# Patient Record
Sex: FEMALE | Race: ASIAN | NOT HISPANIC OR LATINO | Employment: UNEMPLOYED
[De-identification: names, ages, dates, MRNs, and addresses within clinical notes are randomized per-mention and may not be internally consistent; named-entity substitution may affect disease eponyms.]

---

## 2017-12-03 ENCOUNTER — HEALTH MAINTENANCE LETTER (OUTPATIENT)
Age: 37
End: 2017-12-03

## 2018-07-30 ENCOUNTER — OFFICE VISIT (OUTPATIENT)
Dept: FAMILY MEDICINE | Facility: CLINIC | Age: 38
End: 2018-07-30
Payer: COMMERCIAL

## 2018-07-30 VITALS — DIASTOLIC BLOOD PRESSURE: 74 MMHG | SYSTOLIC BLOOD PRESSURE: 111 MMHG | TEMPERATURE: 98.4 F

## 2018-07-30 DIAGNOSIS — Z71.84 TRAVEL ADVICE ENCOUNTER: Primary | ICD-10-CM

## 2018-07-30 DIAGNOSIS — Z23 NEED FOR VACCINATION: ICD-10-CM

## 2018-07-30 PROCEDURE — 90715 TDAP VACCINE 7 YRS/> IM: CPT | Mod: GA | Performed by: NURSE PRACTITIONER

## 2018-07-30 PROCEDURE — 90471 IMMUNIZATION ADMIN: CPT | Mod: GA | Performed by: NURSE PRACTITIONER

## 2018-07-30 PROCEDURE — 99402 PREV MED CNSL INDIV APPRX 30: CPT | Mod: 25 | Performed by: NURSE PRACTITIONER

## 2018-07-30 PROCEDURE — 90675 RABIES VACCINE IM: CPT | Mod: GA | Performed by: NURSE PRACTITIONER

## 2018-07-30 PROCEDURE — 90472 IMMUNIZATION ADMIN EACH ADD: CPT | Mod: GA | Performed by: NURSE PRACTITIONER

## 2018-07-30 RX ORDER — ATOVAQUONE AND PROGUANIL HYDROCHLORIDE 250; 100 MG/1; MG/1
1 TABLET, FILM COATED ORAL DAILY
Qty: 24 TABLET | Refills: 0 | Status: SHIPPED | OUTPATIENT
Start: 2018-07-30 | End: 2020-05-07

## 2018-07-30 RX ORDER — AZITHROMYCIN 500 MG/1
500 TABLET, FILM COATED ORAL DAILY
Qty: 3 TABLET | Refills: 0 | Status: SHIPPED | OUTPATIENT
Start: 2018-07-30 | End: 2018-08-02

## 2018-07-30 NOTE — PROGRESS NOTES
Nurse Note      Itinerary:  [place]      Departure Date: 11/14/18      Return Date: 11/27/18      Length of Trip 12 days      Reason for Travel: Tourism           Urban or rural: both      Accommodations: Hotel        IMMUNIZATION HISTORY  Have you received any immunizations within the past 4 weeks?  No  Have you ever fainted from having your blood drawn or from an injection?  No  Have you ever had a fever reaction to vaccination?  No  Have you ever had any bad reaction or side effect from any vaccination?  No  Have you ever had hepatitis A or B vaccine?  Yes  Do you live (or work closely) with anyone who has AIDS, an AIDS-like condition, any other immune disorder or who is on chemotherapy for cancer?  No  Do you have a family history of immunodeficiency?  No  Have you received any injection of immune globulin or any blood products during the past 12 months?  No    Patient roomed by Dc Hodges Porsha Harrison is a 38 year old female seen today with spouse for counsultation for international travel to [facility] for Tourism.  Patient will be departing in  4 month(s) and staying for   2 week(s) and  traveling with spouse.      Patient itinerary :  will be in the urban and rural ([facility]  region of [facility] which presents risk for Malaria, Dengue Fever, Chikungungya, Zika, Schistosomiasis, Rabies, food borne illnesses, motor vehicle accidents, Typhoid and Leishmaniasis. exposure.      Patient's activities will include sightseeing.    Patient's country of birth is     Special medical concerns: none  Pre-travel questionnaire was completed by patient and reviewed by provider.     Vitals: /74 (BP Location: Left arm, Cuff Size: Adult Regular)  Temp 98.4  F (36.9  C) (Oral)  BMI= There is no height or weight on file to calculate BMI.    EXAM:  General:  Well-nourished, well-developed in no acute distress.  Appears to be stated age, interacts appropriately and expresses understanding of  information given to patient.    No current outpatient prescriptions on file.     Patient Active Problem List   Diagnosis     Irregular menses     No Known Allergies      Immunizations discussed include:   Hepatitis A:  Immune by disease history per patient report  Hepatitis B: Immune by disease history per patient report  Influenza: vaccine is not available  Typhoid: future order placed  Rabies: Ordered/given today, risks, benefits and side effects reviewed  Yellow Fever: Not indicated  Japanese Encephalitis: Declined  Not concerned about risk of disease  Meningococcus: Not indicated  Tetanus/Diphtheria: Ordered/given today, risks, benefits and side effects reviewed  Measles/Mumps/Rubella: Up to date  Cholera: Not needed  Polio: Up to date  Pneumococcal: Under age of 65  Varicella: Immune by disease history per patient report  Zostavax:  Not indicated  Shingrix: Not indicated  HPV:  Not indicated  TB:  Low risk     Altitude Exposure on this trip: no  Past tolerance to Altitude: na    ASSESSMENT/PLAN:    ICD-10-CM    1. Travel advice encounter Z71.89 TDAP, IM (10 - 64 YRS) - Adacel     atovaquone-proguanil (MALARONE) 250-100 MG per tablet     C IMOVAX RABIES VACCINE, IM     TYPHOID VACCINE, IM     C IMOVAX RABIES VACCINE, IM     azithromycin (ZITHROMAX) 500 MG tablet     ADMIN 1st VACCINE     EA ADD'L VACCINE     CANCELED: TYPHOID VACCINE, IM     CANCELED: C IMOVAX RABIES VACCINE, IM   2. Need for vaccination Z23 TDAP, IM (10 - 64 YRS) - Adacel     C IMOVAX RABIES VACCINE, IM     TYPHOID VACCINE, IM     C IMOVAX RABIES VACCINE, IM     ADMIN 1st VACCINE     EA ADD'L VACCINE     CANCELED: TYPHOID VACCINE, IM     CANCELED: C IMOVAX RABIES VACCINE, IM     I have reviewed general recommendations for safe travel   including: food/water precautions, insect precautions, safer sex   practices given high prevalence of Zika, HIV and other STDs,   roadway safety. Educational materials and Travax report provided.    Lilian  prophylaxis recommended: Malarone  Symptomatic treatment for traveler's diarrhea: azithromycin  Altitude illness prevention and treatment: none      Evacuation insurance advised and resources were provided to patient.    Total visit time 30 minutes  with over 50% of time spent counseling patient as detailed above.    Lorraine Phillips CNP

## 2018-07-30 NOTE — MR AVS SNAPSHOT
After Visit Summary   7/30/2018    Deepika Harrison    MRN: [id number]           Patient Information     Date Of Birth          1980        Visit Information        Provider Department      7/30/2018 9:30 AM Lorraine Phillips APRN [facility]        Today's Diagnoses     Travel advice encounter    -  1    Need for vaccination          Care Instructions    Today July 30, 2018 you received the    Rabies Vaccine - Please return on 8/6/18 for your 2nd dose and 8/20/2018 for your 3rd and final dose.    Tetanus (Tdap) Vaccine      08/06/2018  Typhoid IM  .    These appointments can be made as a NURSE ONLY visit.    **It is very important for the vaccinations to be given on the scheduled day(s), this helps ensure you receive the full effectiveness of the vaccine.**    Please call [facility] with any questions [phone]    Thank you for visiting [facility]                Follow-ups after your visit        Future tests that were ordered for you today     Open Standing Orders        Priority Remaining Interval Expires Ordered    C IMOVAX RABIES VACCINE, IM Routine 2/3  6/30/2019 7/30/2018          Open Future Orders        Priority Expected Expires Ordered    TYPHOID VACCINE, IM Routine 8/6/2018 7/30/2019 7/30/2018            Who to contact     If you have questions or need follow up information about today's clinic visit or your schedule please contact [facility] directly at [phone].  Normal or non-critical lab and imaging results will be communicated to you by MyChart, letter or phone within 4 business days after the clinic has received the results. If you do not hear from us within 7 days, please contact the clinic through MyChart or phone. If you have a critical or abnormal lab result, we will notify you by phone as soon as possible.  Submit refill requests through Kartela or call your pharmacy and they will forward the  refill request to us. Please allow 3 business days for your refill to be completed.          Additional Information About Your Visit        [employer] Information     [employer] gives you secure access to your electronic health record. If you see a primary care provider, you can also send messages to your care team and make appointments. If you have questions, please call your primary care clinic.  If you do not have a primary care provider, please call [phone] and they will assist you.        Care EveryWhere ID     This is your Care EveryWhere ID. This could be used by other organizations to access your [place] medical records  [id number]        Your Vitals Were     Temperature                   98.4  F (36.9  C) (Oral)            Blood Pressure from Last 3 Encounters:   07/30/18 111/74   12/17/15 116/78    Weight from Last 3 Encounters:   12/17/15 84 lb 6.4 oz (38.3 kg)              We Performed the Following     TDAP, IM (10 - 64 YRS) - Adacel          Today's Medication Changes          These changes are accurate as of 7/30/18 10:27 AM.  If you have any questions, ask your nurse or doctor.               Start taking these medicines.        Dose/Directions    atovaquone-proguanil 250-100 MG per tablet   Commonly known as:  MALARONE   Used for:  Travel advice encounter   Started by:  Lorraine Phillips APRN CNP        Dose:  1 tablet   Take 1 tablet by mouth daily Start 2 days before exposure to Malaria and continue daily till  7 days after exposure.   Quantity:  24 tablet   Refills:  0       azithromycin 500 MG tablet   Commonly known as:  ZITHROMAX   Used for:  Travel advice encounter   Started by:  Lorraine Phillips APRN CNP        Dose:  500 mg   Take 1 tablet (500 mg) by mouth daily for 3 doses Take 1 tablet a day for up to 3 days for severe diarrhea   Quantity:  3 tablet   Refills:  0            Where to get your medicines      These medications were sent to [facility] [address] [place] [address]  [address]  [address], [facility] [address]     Phone:  [phone]     atovaquone-proguanil 250-100 MG per tablet    azithromycin 500 MG tablet                Primary Care Provider Office Phone # Fax #    [facility] [phone] [address]  [facility] [address]        Equal Access to Services     GABINO MCCOLLUM : Hadii aad ku hadasho Soomaali, waaxda luqadaha, qaybta kaalmada adeegyada, waxay idiin hayaan adeeg khgreggsh la'aan . So [facility] [phone].    ATENCIÓN: Si habla espzoya, tiene a trimble disposición servicios gratuitos de asistencia lingüística. Llame al [phone].    We comply with applicable federal civil rights laws and [place] laws. We do not discriminate on the basis of race, color, national origin, age, disability, sex, sexual orientation, or gender identity.            Thank you!     Thank you for choosing [facility]  for your care. Our goal is always to provide you with excellent care. Hearing back from our patients is one way we can continue to improve our services. Please take a few minutes to complete the written survey that you may receive in the mail after your visit with us. Thank you!             Your Updated Medication List - Protect others around you: Learn how to safely use, store and throw away your medicines at www.disposemymeds.org.          This list is accurate as of 7/30/18 10:27 AM.  Always use your most recent med list.                   Brand Name Dispense Instructions for use Diagnosis    atovaquone-proguanil 250-100 MG per tablet    MALARONE    24 tablet    Take 1 tablet by mouth daily Start 2 days before exposure to Malaria and continue daily till  7 days after exposure.    Travel advice encounter       azithromycin 500 MG tablet    ZITHROMAX    3 tablet    Take 1 tablet (500 mg) by mouth daily for 3 doses Take 1 tablet a day for up to 3 days for severe diarrhea    Travel advice encounter

## 2018-07-30 NOTE — NURSING NOTE
Chief Complaint   Patient presents with     [facility] Clinic     initial /74 (BP Location: Left arm, Cuff Size: Adult Regular)  Temp 98.4  F (36.9  C) (Oral) Estimated body mass index is 16.48 kg/(m^2) as calculated from the following:    Height as of 12/17/15: 5' (1.524 m).    Weight as of 12/17/15: 84 lb 6.4 oz (38.3 kg).  BP completed using cuff size: regular.  L   arm      Health Maintenance that is potentially due pending provider review:  NONE    n/a    Dc Upton ma

## 2018-07-30 NOTE — PATIENT INSTRUCTIONS
Today July 30, 2018 you received the    Rabies Vaccine - Please return on 8/6/18 for your 2nd dose and 8/20/2018 for your 3rd and final dose.    Tetanus (Tdap) Vaccine      08/06/2018  Typhoid IM  .    These appointments can be made as a NURSE ONLY visit.    **It is very important for the vaccinations to be given on the scheduled day(s), this helps ensure you receive the full effectiveness of the vaccine.**    Please call [facility] with any questions [phone]    Thank you for visiting [facility]

## 2018-08-06 ENCOUNTER — ALLIED HEALTH/NURSE VISIT (OUTPATIENT)
Dept: NURSING | Facility: CLINIC | Age: 38
End: 2018-08-06
Payer: COMMERCIAL

## 2018-08-06 DIAGNOSIS — Z23 NEED FOR VACCINATION: ICD-10-CM

## 2018-08-06 DIAGNOSIS — Z71.84 TRAVEL ADVICE ENCOUNTER: ICD-10-CM

## 2018-08-06 PROCEDURE — 99207 ZZC NO CHARGE NURSE ONLY: CPT

## 2018-08-06 PROCEDURE — 90472 IMMUNIZATION ADMIN EACH ADD: CPT | Mod: GA

## 2018-08-06 PROCEDURE — 90675 RABIES VACCINE IM: CPT | Mod: GA

## 2018-08-06 PROCEDURE — 90691 TYPHOID VACCINE IM: CPT | Mod: GA

## 2018-08-06 PROCEDURE — 90471 IMMUNIZATION ADMIN: CPT | Mod: GA

## 2018-08-06 NOTE — NURSING NOTE
Screening Questionnaire for Adult Immunization    Are you sick today?   No   Do you have allergies to medications, food, a vaccine component or latex?   No   Have you ever had a serious reaction after receiving a vaccination?   No   Do you have a long-term health problem with heart disease, lung disease, asthma, kidney disease, metabolic disease (e.g. diabetes), anemia, or other blood disorder?   No   Do you have cancer, leukemia, HIV/AIDS, or any other immune system problem?   No   In the past 3 months, have you taken medications that affect  your immune system, such as prednisone, other steroids, or anticancer drugs; drugs for the treatment of rheumatoid arthritis, Crohn s disease, or psoriasis; or have you had radiation treatments?   No   Have you had a seizure, or a brain or other nervous system problem?   No   During the past year, have you received a transfusion of blood or blood     products, or been given immune (gamma) globulin or antiviral drug?   No   For women: Are you pregnant or is there a chance you could become        pregnant during the next month?   No   Have you received any vaccinations in the past 4 weeks?   No     Immunization questionnaire answers were all negative.        Per orders of CLAYTON Bruner, injection of Rabies and Typhoid was given by Hernandez Walter. Patient instructed to remain in clinic for 15 minutes afterwards, and to report any adverse reaction to me immediately.       Screening performed by Hernandez Walter on 8/6/2018 at 7:44 AM.

## 2018-08-20 ENCOUNTER — ALLIED HEALTH/NURSE VISIT (OUTPATIENT)
Dept: NURSING | Facility: CLINIC | Age: 38
End: 2018-08-20
Payer: COMMERCIAL

## 2018-08-20 DIAGNOSIS — Z71.84 TRAVEL ADVICE ENCOUNTER: ICD-10-CM

## 2018-08-20 DIAGNOSIS — Z23 NEED FOR VACCINATION: ICD-10-CM

## 2018-08-20 PROCEDURE — 90675 RABIES VACCINE IM: CPT | Mod: GA

## 2018-08-20 PROCEDURE — 99207 ZZC NO CHARGE LOS: CPT

## 2018-08-20 PROCEDURE — 90471 IMMUNIZATION ADMIN: CPT | Mod: GA

## 2018-08-20 NOTE — MR AVS SNAPSHOT
After Visit Summary   8/20/2018    Werner Harrison    MRN: [id number]           Patient Information     Date Of Birth          1980        Visit Information        Provider Department      8/20/2018 9:00 AM UP NURSE [place] [place] [facility] Nurse        Today's Diagnoses     Need for vaccination        Travel advice encounter           Follow-ups after your visit        Who to contact     If you have questions or need follow up information about today's clinic visit or your schedule please contact [facility] directly at [phone].  Normal or non-critical lab and imaging results will be communicated to you by Socrativehart, letter or phone within 4 business days after the clinic has received the results. If you do not hear from us within 7 days, please contact the clinic through [employer] or phone. If you have a critical or abnormal lab result, we will notify you by phone as soon as possible.  Submit refill requests through [employer] or call your pharmacy and they will forward the refill request to us. Please allow 3 business days for your refill to be completed.          Additional Information About Your Visit        MyChart Information     [employer] gives you secure access to your electronic health record. If you see a primary care provider, you can also send messages to your care team and make appointments. If you have questions, please call your primary care clinic.  If you do not have a primary care provider, please call [phone] and they will assist you.        Care EveryWhere ID     This is your Care EveryWhere ID. This could be used by other organizations to access your [place] medical records  [id number]         Blood Pressure from Last 3 Encounters:   07/30/18 111/74   12/17/15 116/78    Weight from Last 3 Encounters:   12/17/15 84 lb 6.4 oz (38.3 kg)              We Performed the Following     C IMOVAX RABIES VACCINE, IM     VACCINE ADMINISTRATION, INITIAL        Primary Care  Provider Office Phone # Fax #    [facility] [phone] [address]  [facility] [address]        Equal Access to Services     CEDRICK MCCOLLUM : Hadii aad ku hadasho Soomaali, waaxda luqadaha, qaybta kaalmada adeegyada, waxkathleen lopezn enedina dickson laPoloholger carreon. So [facility] [phone].    ATENCIÓN: Si habla español, tiene a trimble disposición servicios gratuitos de asistencia lingüística. Llame al [phone].    We comply with applicable federal civil rights laws and [place] laws. We do not discriminate on the basis of race, color, national origin, age, disability, sex, sexual orientation, or gender identity.            Thank you!     Thank you for choosing [facility]  for your care. Our goal is always to provide you with excellent care. Hearing back from our patients is one way we can continue to improve our services. Please take a few minutes to complete the written survey that you may receive in the mail after your visit with us. Thank you!             Your Updated Medication List - Protect others around you: Learn how to safely use, store and throw away your medicines at www.disposemymeds.org.          This list is accurate as of 8/20/18  5:27 PM.  Always use your most recent med list.                   Brand Name Dispense Instructions for use Diagnosis    atovaquone-proguanil 250-100 MG per tablet    MALARONE    24 tablet    Take 1 tablet by mouth daily Start 2 days before exposure to Malaria and continue daily till  7 days after exposure.    Travel advice encounter

## 2018-08-20 NOTE — PROGRESS NOTES
Screening Questionnaire for Adult Immunization    Are you sick today?   No   Do you have allergies to medications, food, a vaccine component or latex?   No   Have you ever had a serious reaction after receiving a vaccination?   No   Do you have a long-term health problem with heart disease, lung disease, asthma, kidney disease, metabolic disease (e.g. diabetes), anemia, or other blood disorder?   No   Do you have cancer, leukemia, HIV/AIDS, or any other immune system problem?   No   In the past 3 months, have you taken medications that affect  your immune system, such as prednisone, other steroids, or anticancer drugs; drugs for the treatment of rheumatoid arthritis, Crohn s disease, or psoriasis; or have you had radiation treatments?   No   Have you had a seizure, or a brain or other nervous system problem?   No   During the past year, have you received a transfusion of blood or blood     products, or been given immune (gamma) globulin or antiviral drug?   No   For women: Are you pregnant or is there a chance you could become        pregnant during the next month?   No   Have you received any vaccinations in the past 4 weeks?   No     Immunization questionnaire answers were all negative.      Prior to injection verified patient identity using patient's name and date of birth.  Due to injection administration, patient instructed to remain in clinic for 15 minutes  afterwards, and to report any adverse reaction to me immediately.      Per orders of JD Phillips, injection of Rabies given by Tamika Mendoza. Patient instructed to remain in clinic for 15 minutes afterwards, and to report any adverse reaction to me immediately.       Screening performed by Tamika Mendoza on 8/20/2018 at 5:25 PM.

## 2019-03-11 ENCOUNTER — OFFICE VISIT (OUTPATIENT)
Dept: FAMILY MEDICINE | Facility: CLINIC | Age: 39
End: 2019-03-11
Payer: COMMERCIAL

## 2019-03-11 VITALS
RESPIRATION RATE: 16 BRPM | WEIGHT: 74.4 LBS | TEMPERATURE: 97.6 F | DIASTOLIC BLOOD PRESSURE: 56 MMHG | BODY MASS INDEX: 14.05 KG/M2 | SYSTOLIC BLOOD PRESSURE: 95 MMHG | HEIGHT: 61 IN | HEART RATE: 82 BPM

## 2019-03-11 DIAGNOSIS — M25.511 ACUTE PAIN OF RIGHT SHOULDER: ICD-10-CM

## 2019-03-11 DIAGNOSIS — M54.2 NECK PAIN: Primary | ICD-10-CM

## 2019-03-11 DIAGNOSIS — M79.601 PAIN OF RIGHT UPPER EXTREMITY: ICD-10-CM

## 2019-03-11 PROCEDURE — 99214 OFFICE O/P EST MOD 30 MIN: CPT | Performed by: NURSE PRACTITIONER

## 2019-03-11 RX ORDER — CYCLOBENZAPRINE HCL 5 MG
5 TABLET ORAL 3 TIMES DAILY PRN
Qty: 30 TABLET | Refills: 0 | Status: SHIPPED | OUTPATIENT
Start: 2019-03-11 | End: 2020-05-07

## 2019-03-11 ASSESSMENT — MIFFLIN-ST. JEOR: SCORE: 964.86

## 2019-03-11 NOTE — PROGRESS NOTES
"  SUBJECTIVE:   Werner Harrison is a 38 year old female who presents to clinic today for the following health issues:        Musculoskeletal problem/pain      Duration: 3 MONTHS      Description  Location: right shoulder and right neck pain    Intensity:  moderate    Accompanying signs and symptoms: tingling    History  Previous similar problem: no   Previous evaluation:  none    Precipitating or alleviating factors:  Trauma or overuse: no   Aggravating factors include: moving her right arm       Problem list and histories reviewed & adjusted, as indicated.  Additional history: as documented    Reviewed and updated as needed this visit by clinical staff  Tobacco  Allergies  Meds  Problems  Med Hx  Surg Hx  Fam Hx  Soc Hx        Reviewed and updated as needed this visit by Provider  Tobacco  Allergies  Meds  Problems  Med Hx  Surg Hx  Fam Hx         ROS:  Constitutional, HEENT, cardiovascular, pulmonary, gi and gu systems are negative, except as otherwise noted.    OBJECTIVE:     BP 95/56   Pulse 82   Temp 97.6  F (36.4  C) (Tympanic)   Resp 16   Ht 1.549 m (5' 1\")   Wt 33.7 kg (74 lb 6.4 oz)   BMI 14.06 kg/m    Body mass index is 14.06 kg/m .  GENERAL: healthy, alert and no distress  EYES: Eyes grossly normal to inspection, PERRL and conjunctivae and sclerae normal  HENT: ear canals and TM's normal, nose and mouth without ulcers or lesions  NECK: no adenopathy, no asymmetry, masses, or scars and thyroid normal to palpation  RESP: lungs clear to auscultation - no rales, rhonchi or wheezes  CV: regular rate and rhythm, normal S1 S2, no S3 or S4, no murmur, click or rub, no peripheral edema and peripheral pulses strong  MS: phalens positive on right - 2nd, 3rd and 4th fingertips.  tinels and finklesteins negative.  extremities normal- no gross deformities noted  SKIN: no suspicious lesions or rashes      ASSESSMENT/PLAN:       ICD-10-CM    1. Neck pain M54.2 [facility] " REFERRAL     cyclobenzaprine (FLEXERIL) 5 MG tablet   2. Acute pain of right shoulder M25.511 [facility], [facility] CHIR[facility] REFERRAL     cyclobenzaprine (FLEXERIL) 5 MG tablet   3. Pain of right upper extremity M79.601 [facility], [facility] CHIR[facility]RA[facility] REFERRAL     cyclobenzaprine (FLEXERIL) 5 MG tablet     Neck/shoulder and right arm pains with overuse/typing.    2nd 3rd and 4th fingertips with phalens.    Will trial flexeril as needed for nighttime use only.    Recommend PT for stretching and strengthening  Hot packs/ massage to neck and shoulder.    F/u if persists or worsens.    See Patient Instructions    JANUSZ Velez CNP  [facility]

## 2019-03-21 ENCOUNTER — THERAPY VISIT (OUTPATIENT)
Dept: PHYSICAL THERAPY | Facility: CLINIC | Age: 39
End: 2019-03-21
Attending: NURSE PRACTITIONER
Payer: COMMERCIAL

## 2019-03-21 DIAGNOSIS — M79.601 PAIN OF RIGHT UPPER EXTREMITY: ICD-10-CM

## 2019-03-21 DIAGNOSIS — M25.511 ACUTE PAIN OF RIGHT SHOULDER: ICD-10-CM

## 2019-03-21 DIAGNOSIS — M54.2 NECK PAIN: ICD-10-CM

## 2019-03-21 PROCEDURE — 97161 PT EVAL LOW COMPLEX 20 MIN: CPT | Mod: GP | Performed by: PHYSICAL THERAPIST

## 2019-03-21 PROCEDURE — 97530 THERAPEUTIC ACTIVITIES: CPT | Mod: GP | Performed by: PHYSICAL THERAPIST

## 2019-03-21 PROCEDURE — 97112 NEUROMUSCULAR REEDUCATION: CPT | Mod: GP | Performed by: PHYSICAL THERAPIST

## 2019-03-21 NOTE — PROGRESS NOTES
Physical Therapy Initial Evaluation  March 21, 2019     Precautions/Restrictions/MD instructions: PT eval and treat.     Subjective:   Date of Onset: 3 months ago, MD order on 3/11/19  C/C: pain at the right side of her lower neck, down medial forearm, and wrist. She also reports tingling into fingertips 1-4. She gets stress related headaches 1x every 2 weeks that are one sided, throbbing that she does not think is related to her arm pain.  Quality of pain is dull and aching and Numbness and tingling. Pains are described as intermittent in nature. Pain is worse: with use. Pain is rated 1-8/10.   History of symptoms: Pains began gradually as the result  Of unknown origins. She thinks it may be related to increased computer work. Since onset, symptoms are worsening. Previous episodes:none  Worsened by: computer work, supination, pronation, doing the dishes and lifting after a long day of work  Alleviated by: massage, rest.  Shaking her hand, ergonomic mouse (mousing with left hand as a result.)  General health as reported by patient: fair  Pertinent medical/surgical history: N&T, headaches, smoking, sleep disorder, chest pain- from panic disorder that happens infrequently. She denies night pain, significant current illness or recent hospital admission. She denies any regional implanted devices. She denies history of surgery in the area.  Imaging: none. Current occupational status: HR. Patient's goals are: decrease pain, learn how to prevent the pain, improve tolerance to computer work. Return to MD:  PRN.     Objective:  CERVICAL:    Posture: significant forward shoulder posture, scapular winging bilaterally, right greater than left.  Posture Correction: lumbar roll    Neurological:    Motor Deficit:  Myotomes L R   C4 (shoulder elevation) 5/5 4/5   C5 (shoulder abduction) 4+/5 4/5   C6 (elbow flexion) 5/5 4/5   C7 (elbow extension) 5/5 4/5   C8 (thumb extension) 5/5 4/5   T1 (finger add/abd) 5/5 4/5    Strength  (lb) WNL Slight reduction     Sensory Deficit, Reflexes, Dural Signs:   Intact to light touch sensation in all UE dermatomes. Brachioradialis, triceps and biceps reflex 2+ B.    AROM: (Major, Moderate, Minimal or Nil loss)  Movement Loss Jasson Mod Min Nil Pain   Protrusion  x   X    Flexion  x   X N&T   Retraction x    x   Extension  x   X N&T   Left Rotation   x  x   Right Rotation   x  x   Left Side Bending   x  x   Right Side bending   x  x     SHOULDER:   AROM L AROM R   Flex 180 180   Abd 180 175*   Ext 60 55*   ER 85 85*   Ext/IR T7 T6     ELBOW:   AROM L AROM R   Flex 135 135   Ext 0 0   Hyper Ext 10 8*   Pronation 80 75* N&T   Supination 90 90* N&T     WRIST:   AROM L AROM R   Flex 80 70   Ext 70 65     Special Testing:   L R   Arm Dominance  x   Carpal tunnel     Phalen's - +   Reverse Phalen's - +   tinel's - +   Hand shake for symptom relief  + +   Cervical     Spurlings  +   Distraction - Negative, no N&T in static position   Cubital tunnel     Tinel's - +       Assessment/Plan:    The patient is a 38 year old female with chief complaint of neck, right shoulder, and arm pain, numbness and tingling, consistent with carpal tunnel with a relevant cervical component.  The patient has the following significant findings with corresponding treatment plan.  Diagnosis 1:  Neck, right shoulder, and arm pain    Pain -  hot/cold therapy, manual therapy, splint/taping/bracing/orthotics, self management, education, directional preference exercise and home program  Decreased ROM/flexibility - manual therapy and therapeutic exercise  Decreased strength - therapeutic exercise and therapeutic activities  Decreased proprioception - neuro re-education and therapeutic activities  Impaired muscle performance - neuro re-education  Decreased function - therapeutic activities  Impaired posture - neuro re-education    Therapy Evaluation Codes:   1) History comprised of:   Personal factors that impact the plan of care:      Time  since onset of symptoms.    Comorbidity factors that impact the plan of care are:      Chest pain, Migraines/headaches, Numbness/tingling, Sleep disorder/apnea and Smoking.     Medications impacting care: None.  2) Examination of Body Systems comprised of:   Body structures and functions that impact the plan of care:      Cervical spine, Shoulder, Thoracic Spine and arm and hand.   Activity limitations that impact the plan of care are:      Cooking, Dressing, Lifting, Reading/Computer work and cleaning.   Clinical presentation characteristics are:    Stable/Uncomplicated.  3) Presentation comprised of:   Presentation scored as Low complexity with uncomplicated characteristics..  4) Decision-Making    Low complexity using standardized patient assessment instrument and/or measureable assessment of functional outcome.  Cumulative Therapy Evaluation is: Low complexity.    Previous and current functional limitations:  (See Goal Flow Sheet for this information)    Short term and Long term goals: (See Goal Flow Sheet for this information)     Communication ability:  Patient appears to be able to clearly communicate and understand verbal and written communication and follow directions correctly.  Treatment Explanation - The following has been discussed with the patient: RX ordered/plan of care, anticipated outcomes, and possible risks and side effects.  This patient would benefit from PT intervention to resume normal activities.   Rehab potential is good.    Frequency:  1 X week, once daily  Duration:  for 8 weeks  Discharge Plan: Achieve all LTGs, be independent in home treatment program, and reach maximal therapeutic benefit.    Please refer to the daily flowsheet for treatment today, total treatment time and time spent performing 1:1 timed codes.

## 2019-03-29 ENCOUNTER — DOCUMENTATION ONLY (OUTPATIENT)
Dept: FAMILY MEDICINE | Facility: CLINIC | Age: 39
End: 2019-03-29

## 2019-03-29 ENCOUNTER — THERAPY VISIT (OUTPATIENT)
Dept: PHYSICAL THERAPY | Facility: CLINIC | Age: 39
End: 2019-03-29
Payer: COMMERCIAL

## 2019-03-29 DIAGNOSIS — M25.511 ACUTE PAIN OF RIGHT SHOULDER: ICD-10-CM

## 2019-03-29 DIAGNOSIS — M79.601 PAIN OF RIGHT UPPER EXTREMITY: ICD-10-CM

## 2019-03-29 DIAGNOSIS — M54.2 NECK PAIN: ICD-10-CM

## 2019-03-29 PROCEDURE — 97140 MANUAL THERAPY 1/> REGIONS: CPT | Mod: GP | Performed by: PHYSICAL THERAPIST

## 2019-03-29 PROCEDURE — 97112 NEUROMUSCULAR REEDUCATION: CPT | Mod: GP | Performed by: PHYSICAL THERAPIST

## 2019-03-29 PROCEDURE — 97110 THERAPEUTIC EXERCISES: CPT | Mod: GP | Performed by: PHYSICAL THERAPIST

## 2019-03-29 NOTE — PROGRESS NOTES
Per Mimi Lester pt will need and office vist to have the FMLA forms completed. Called pt and notified her that she will need to be seen in clinic, pt states that is not a good time to talk and she will call back later to set up an Appt.    Place from's in the [place] nurse basket tell the pts comes in to the clinic.      Lou Hitchcock MA        HPI      ROS      Physical Exam

## 2019-03-29 NOTE — PROGRESS NOTES
Reason for call:  Form   Our goal is to have forms completed within 72 hours, however some forms may require a visit or additional information.     Who is the form from? Patient  Where did the form come from? Patient or family brought in     What clinic location was the form placed at?   Where was the form placed? 's Box  What number is listed as a contact on the form?     Phone call message - patient request for a letter, form or note:     Date needed: as soon as possible  Please fax to [phone]  Has the patient signed a consent form for release of information? Not Applicable    Additional comments: Please fax when completed. Patient will be in the office 4/5 and will  original.     Type of letter, form or note: LA    Phone number to reach patient:  Cell number on file:    Telephone Information:   Mobile [phone]       Best Time:  any    Can we leave a detailed message on this number?  YES

## 2019-04-30 ENCOUNTER — TELEPHONE (OUTPATIENT)
Dept: FAMILY MEDICINE | Facility: CLINIC | Age: 39
End: 2019-04-30

## 2019-04-30 NOTE — TELEPHONE ENCOUNTER
LVM on work number to schedule office visit with Mimi Lester in order to have the FMLA forms completed. Also sent another [employer] messge.Flor Echevarria NA/R

## 2019-05-03 NOTE — TELEPHONE ENCOUNTER
"Patient has read [employer] messages regarding forms but has not replied back. Forms placed in \"New patient\" accordion in [place].     Katherine Lott       "

## 2019-11-25 PROBLEM — M25.511 ACUTE PAIN OF RIGHT SHOULDER: Status: RESOLVED | Noted: 2019-03-21 | Resolved: 2019-11-25

## 2019-11-25 PROBLEM — M79.601 PAIN OF RIGHT UPPER EXTREMITY: Status: RESOLVED | Noted: 2019-03-21 | Resolved: 2019-11-25

## 2019-11-25 PROBLEM — M54.2 NECK PAIN: Status: RESOLVED | Noted: 2019-03-21 | Resolved: 2019-11-25

## 2019-11-25 NOTE — PROGRESS NOTES
Discharge Note    Progress reporting period is from last progress note on March 21 to Mar 29, 2019.    Werner failed to follow up and current status is unknown.  Please see information below for last relevant information on current status.  Patient seen for 2 visits.    SUBJECTIVE  Subjective changes noted by patient:  Pt reports limited compliance with HEP. She is having less pain in her shoulder and forearm. She has also been limiting her mousing. Reduced N&T from 10 hours per day to 5 hours per day. She is   .  Current pain level is (6-7/10, reduces to 4-5/10 following PT).     Previous pain level was  8/10(end of day).   Changes in function:  Yes (See Goal flowsheet attached for changes in current functional level)  Adverse reaction to treatment or activity: None    OBJECTIVE  Changes noted in objective findings: Cervical ROM: Flexion 50%, Extension 25%, L rotation 90%, R rotation 90%, retraction 50%, protrusion 75%. Following stretching/manual, Flexion 50%, Extension 75% without N&T, B rotation 100%, retraction 75%, protrusion 90%.     ASSESSMENT/PLAN  Diagnosis: neck, right arm and shoulder pain   Updated problem list and treatment plan:   Pain - HEP  Decreased ROM/flexibility - HEP  Decreased function - HEP  Decreased strength - HEP  Impaired muscle performance - HEP  Decreased proprioception - HEP  Impaired posture - HEP  STG/LTGs have been met or progress has been made towards goals:  Yes, please see goal flowsheet for most current information  Assessment of Progress: current status is unknown.    Last current status:     Self Management Plans:  HEP  I have re-evaluated this patient and find that the nature, scope, duration and intensity of the therapy is appropriate for the medical condition of the patient.  Werner continues to require the following intervention to meet STG and LTG's:  HEP.    Recommendations:  Discharge with current home program.  Patient to follow up with MD as needed.    Please  refer to the daily flowsheet for treatment today, total treatment time and time spent performing 1:1 timed codes.

## 2020-02-14 ENCOUNTER — TELEPHONE (OUTPATIENT)
Dept: FAMILY MEDICINE | Facility: CLINIC | Age: 40
End: 2020-02-14

## 2020-02-14 ENCOUNTER — OFFICE VISIT (OUTPATIENT)
Dept: FAMILY MEDICINE | Facility: CLINIC | Age: 40
End: 2020-02-14
Payer: COMMERCIAL

## 2020-02-14 VITALS
BODY MASS INDEX: 14.35 KG/M2 | DIASTOLIC BLOOD PRESSURE: 68 MMHG | RESPIRATION RATE: 16 BRPM | SYSTOLIC BLOOD PRESSURE: 103 MMHG | TEMPERATURE: 97.5 F | HEART RATE: 64 BPM | OXYGEN SATURATION: 100 % | HEIGHT: 61 IN | WEIGHT: 76 LBS

## 2020-02-14 DIAGNOSIS — F41.9 ANXIETY: ICD-10-CM

## 2020-02-14 DIAGNOSIS — F32.A DEPRESSION, UNSPECIFIED DEPRESSION TYPE: Primary | ICD-10-CM

## 2020-02-14 DIAGNOSIS — E87.6 HYPOKALEMIA: ICD-10-CM

## 2020-02-14 DIAGNOSIS — E87.6 HYPOKALEMIA: Primary | ICD-10-CM

## 2020-02-14 LAB
ALBUMIN SERPL-MCNC: 4 G/DL (ref 3.4–5)
ALP SERPL-CCNC: 41 U/L (ref 40–150)
ALT SERPL W P-5'-P-CCNC: 19 U/L (ref 0–50)
ANION GAP SERPL CALCULATED.3IONS-SCNC: 7 MMOL/L (ref 3–14)
AST SERPL W P-5'-P-CCNC: 22 U/L (ref 0–45)
BASOPHILS # BLD AUTO: 0.1 10E9/L (ref 0–0.2)
BASOPHILS NFR BLD AUTO: 1.8 %
BILIRUB SERPL-MCNC: 0.8 MG/DL (ref 0.2–1.3)
BUN SERPL-MCNC: 14 MG/DL (ref 7–30)
CALCIUM SERPL-MCNC: 9.8 MG/DL (ref 8.5–10.1)
CHLORIDE SERPL-SCNC: 90 MMOL/L (ref 94–109)
CO2 SERPL-SCNC: 33 MMOL/L (ref 20–32)
CREAT SERPL-MCNC: 0.76 MG/DL (ref 0.52–1.04)
DIFFERENTIAL METHOD BLD: NORMAL
EOSINOPHIL # BLD AUTO: 0.2 10E9/L (ref 0–0.7)
EOSINOPHIL NFR BLD AUTO: 3.4 %
ERYTHROCYTE [DISTWIDTH] IN BLOOD BY AUTOMATED COUNT: 12.6 % (ref 10–15)
GFR SERPL CREATININE-BSD FRML MDRD: >90 ML/MIN/{1.73_M2}
GLUCOSE SERPL-MCNC: 84 MG/DL (ref 70–99)
HCT VFR BLD AUTO: 35.6 % (ref 35–47)
HGB BLD-MCNC: 12.1 G/DL (ref 11.7–15.7)
LYMPHOCYTES # BLD AUTO: 1.6 10E9/L (ref 0.8–5.3)
LYMPHOCYTES NFR BLD AUTO: 27.9 %
MCH RBC QN AUTO: 29.3 PG (ref 26.5–33)
MCHC RBC AUTO-ENTMCNC: 34 G/DL (ref 31.5–36.5)
MCV RBC AUTO: 86 FL (ref 78–100)
MONOCYTES # BLD AUTO: 0.3 10E9/L (ref 0–1.3)
MONOCYTES NFR BLD AUTO: 5.1 %
NEUTROPHILS # BLD AUTO: 3.5 10E9/L (ref 1.6–8.3)
NEUTROPHILS NFR BLD AUTO: 61.8 %
PLATELET # BLD AUTO: 168 10E9/L (ref 150–450)
POTASSIUM SERPL-SCNC: 2.6 MMOL/L (ref 3.4–5.3)
PROT SERPL-MCNC: 7.3 G/DL (ref 6.8–8.8)
RBC # BLD AUTO: 4.13 10E12/L (ref 3.8–5.2)
SODIUM SERPL-SCNC: 130 MMOL/L (ref 133–144)
TSH SERPL DL<=0.005 MIU/L-ACNC: 0.85 MU/L (ref 0.4–4)
WBC # BLD AUTO: 5.7 10E9/L (ref 4–11)

## 2020-02-14 PROCEDURE — 99203 OFFICE O/P NEW LOW 30 MIN: CPT | Performed by: PREVENTIVE MEDICINE

## 2020-02-14 PROCEDURE — 36415 COLL VENOUS BLD VENIPUNCTURE: CPT | Performed by: PREVENTIVE MEDICINE

## 2020-02-14 PROCEDURE — 82306 VITAMIN D 25 HYDROXY: CPT | Performed by: PREVENTIVE MEDICINE

## 2020-02-14 PROCEDURE — 80050 GENERAL HEALTH PANEL: CPT | Performed by: PREVENTIVE MEDICINE

## 2020-02-14 RX ORDER — POTASSIUM CHLORIDE 1500 MG/1
40 TABLET, EXTENDED RELEASE ORAL DAILY
Qty: 30 TABLET | Refills: 0 | Status: SHIPPED | OUTPATIENT
Start: 2020-02-14 | End: 2020-05-07

## 2020-02-14 ASSESSMENT — PATIENT HEALTH QUESTIONNAIRE - PHQ9
SUM OF ALL RESPONSES TO PHQ QUESTIONS 1-9: 20
5. POOR APPETITE OR OVEREATING: NEARLY EVERY DAY

## 2020-02-14 ASSESSMENT — ANXIETY QUESTIONNAIRES
1. FEELING NERVOUS, ANXIOUS, OR ON EDGE: MORE THAN HALF THE DAYS
3. WORRYING TOO MUCH ABOUT DIFFERENT THINGS: NEARLY EVERY DAY
6. BECOMING EASILY ANNOYED OR IRRITABLE: MORE THAN HALF THE DAYS
7. FEELING AFRAID AS IF SOMETHING AWFUL MIGHT HAPPEN: SEVERAL DAYS
5. BEING SO RESTLESS THAT IT IS HARD TO SIT STILL: MORE THAN HALF THE DAYS
2. NOT BEING ABLE TO STOP OR CONTROL WORRYING: NEARLY EVERY DAY
GAD7 TOTAL SCORE: 16

## 2020-02-14 ASSESSMENT — MIFFLIN-ST. JEOR: SCORE: 967.11

## 2020-02-14 NOTE — PROGRESS NOTES
Subjective     Werner Harrison is a 39 year old female who presents to clinic today for the following health issues:    HPI   Abnormal Mood Symptoms  Onset: x 7 years    Description:   Depression: YES  Anxiety: YES    Accompanying Signs & Symptoms: panic attack   Still participating in activities that you used to enjoy: no  Fatigue: YES  Irritability: no   Difficulty concentrating: YES  Changes in appetite: YES  Problems with sleep: YES  Heart racing/beating fast : YES  Thoughts of hurting yourself or others: none    History:   Recent stress: YES  Prior depression hospitalization: no   Family history of depression: no   Family history of anxiety: no     Precipitating factors:   Alcohol/drug use: no     Alleviating factors:  Ptsd    Pt from [place].  Went to boarding school in [place].  Moved to [place] 7 years ago with .  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  Marriage is good.  Has occasional panic attack.  Feels down a lot.  Is a home body.  Is embarassed by her depression.  Passive SI but no plan and no history of hurting herself.  No HI.  No delusions or hallucinations.      Therapies Tried and outcome: None      Patient Active Problem List   Diagnosis     Irregular menses     History reviewed. No pertinent surgical history.    Social History     Tobacco Use     Smoking status: Former Smoker     Packs/day: 1.00     Types: Cigarettes     Last attempt to quit: 2011     Years since quittin.6     Smokeless tobacco: Never Used   Substance Use Topics     Alcohol use: No     Family History   Problem Relation Age of Onset     Diabetes Mother      Diabetes Father              Reviewed and updated as needed this visit by Provider         Review of Systems   ROS COMP: Constitutional, HEENT, cardiovascular, pulmonary, GI, , musculoskeletal, neuro, skin, endocrine and psych systems are negative, except as otherwise noted.      Objective    /68 (BP Location: Left arm, Patient  "Position: Sitting, Cuff Size: Adult Regular)   Pulse 64   Temp 97.5  F (36.4  C) (Oral)   Resp 16   Ht 1.549 m (5' 1\")   Wt 34.5 kg (76 lb)   SpO2 100%   BMI 14.36 kg/m    Body mass index is 14.36 kg/m .  Physical Exam   GENERAL: healthy, alert and no distress  EYES: Eyes grossly normal to inspection, PERRL and conjunctivae and sclerae normal  HENT: ear canals and TM's normal, nose and mouth without ulcers or lesions  NECK: no adenopathy, no asymmetry, masses, or scars and thyroid normal to palpation  RESP: lungs clear to auscultation - no rales, rhonchi or wheezes  BREAST: normal without masses, tenderness or nipple discharge and no palpable axillary masses or adenopathy  CV: regular rate and rhythm, normal S1 S2, no S3 or S4, no murmur, click or rub, no peripheral edema and peripheral pulses strong  ABDOMEN: soft, nontender, no hepatosplenomegaly, no masses and bowel sounds normal  MS: no gross musculoskeletal defects noted, no edema  SKIN: no suspicious lesions or rashes  NEURO: Normal strength and tone, mentation intact and speech normal  PSYCH: mentation appears normal, affect normal/bright    Diagnostic Test Results:  Labs reviewed in Epic        Assessment & Plan     1. Depression, unspecified depression type  Anxiety  Advise counseling and starting zoloft 50 mg daily  Will also check labs  Contracted for safety  Follow up in one month for recheck    - MENTAL HEALTH REFERRAL  - Adult; Outpatient Treatment; Individual/Couples/Family/Group Therapy/Health Psychology; [facility]: [facility] [phone]; We will contact you to schedule the appointment or please call with any questions  - sertraline (ZOLOFT) 50 MG tablet; 50 mg every other day for one week then 50 mg daily ongoing  Dispense: 30 tablet; Refill: 1  - CBC with platelets and differential  - TSH with free T4 reflex  - Comprehensive metabolic panel  - Vitamin D Deficiency     30 minutes spent face to face with patient, over 50% time " counseling, coordinating care and explaining about nature of the patient's conditions.  All risks, benefits of treatment and further evaluation was reviewed with patient.  Pt expressed understanding.  Pt was in agreement with this plan.    See Patient Instructions    No follow-ups on file.    Kalyan Mcallister MD  [facility]

## 2020-02-14 NOTE — PATIENT INSTRUCTIONS
1. Depression, unspecified depression type  Anxiety  Advise counseling and starting zoloft 50 mg daily  Will also check labs  Contracted for safety  Follow up in one month for recheck    - MENTAL HEALTH REFERRAL  - Adult; Outpatient Treatment; Individual/Couples/Family/Group Therapy/Health Psychology; [facility] [place]: [facility] [phone]; We will contact you to schedule the appointment or please call with any questions  - sertraline (ZOLOFT) 50 MG tablet; 50 mg every other day for one week then 50 mg daily ongoing  Dispense: 30 tablet; Refill: 1  - CBC with platelets and differential  - TSH with free T4 reflex  - Comprehensive metabolic panel  - Vitamin D Deficiency

## 2020-02-15 ASSESSMENT — ANXIETY QUESTIONNAIRES: GAD7 TOTAL SCORE: 16

## 2020-02-16 LAB — DEPRECATED CALCIDIOL+CALCIFEROL SERPL-MC: 15 UG/L (ref 20–75)

## 2020-02-17 DIAGNOSIS — E87.6 HYPOKALEMIA: ICD-10-CM

## 2020-02-17 DIAGNOSIS — E55.9 VITAMIN D DEFICIENCY: Primary | ICD-10-CM

## 2020-02-17 PROCEDURE — 36415 COLL VENOUS BLD VENIPUNCTURE: CPT | Performed by: PREVENTIVE MEDICINE

## 2020-02-17 PROCEDURE — 80048 BASIC METABOLIC PNL TOTAL CA: CPT | Performed by: PREVENTIVE MEDICINE

## 2020-02-17 RX ORDER — ERGOCALCIFEROL 1.25 MG/1
50000 CAPSULE, LIQUID FILLED ORAL WEEKLY
Qty: 12 CAPSULE | Refills: 0 | Status: SHIPPED | OUTPATIENT
Start: 2020-02-17 | End: 2020-05-05

## 2020-02-18 LAB
ANION GAP SERPL CALCULATED.3IONS-SCNC: 7 MMOL/L (ref 3–14)
BUN SERPL-MCNC: 9 MG/DL (ref 7–30)
CALCIUM SERPL-MCNC: 9.9 MG/DL (ref 8.5–10.1)
CHLORIDE SERPL-SCNC: 97 MMOL/L (ref 94–109)
CO2 SERPL-SCNC: 28 MMOL/L (ref 20–32)
CREAT SERPL-MCNC: 0.74 MG/DL (ref 0.52–1.04)
GFR SERPL CREATININE-BSD FRML MDRD: >90 ML/MIN/{1.73_M2}
GLUCOSE SERPL-MCNC: 80 MG/DL (ref 70–99)
POTASSIUM SERPL-SCNC: 4.2 MMOL/L (ref 3.4–5.3)
SODIUM SERPL-SCNC: 132 MMOL/L (ref 133–144)

## 2020-03-02 ENCOUNTER — HEALTH MAINTENANCE LETTER (OUTPATIENT)
Age: 40
End: 2020-03-02

## 2020-03-12 ENCOUNTER — DOCUMENTATION ONLY (OUTPATIENT)
Dept: FAMILY MEDICINE | Facility: CLINIC | Age: 40
End: 2020-03-12

## 2020-03-12 NOTE — PROGRESS NOTES
Unable to leave  for patient to schedule 1 month follow-up appointment as mailbox was not set up.   Sending jesshart.    Katherine MONSALVE     [facility]

## 2020-03-12 NOTE — PROGRESS NOTES
Pt should have a clinic appointment as she was just started on zoloft.  The labs are to be checked after that clinic appointment. Please call and schedule provider visit.    thanks

## 2020-03-12 NOTE — PROGRESS NOTES
Patient is coming to [place] for a lab only appointment.  She said you told her to come back in a month to repeat something, and there is no order in the chart for whatever she needs done.  Please place orders.  Close this encounter when you are finished with it.  Thank you.

## 2020-03-17 ENCOUNTER — FCC EXTENDED DOCUMENTATION (OUTPATIENT)
Dept: PSYCHOLOGY | Facility: CLINIC | Age: 40
End: 2020-03-17

## 2020-03-17 ASSESSMENT — COLUMBIA-SUICIDE SEVERITY RATING SCALE - C-SSRS
TOTAL  NUMBER OF ABORTED OR SELF INTERRUPTED ATTEMPTS PAST LIFETIME: NO
TOTAL  NUMBER OF ABORTED OR SELF INTERRUPTED ATTEMPTS PAST 3 MONTHS: NO
1. IN THE PAST MONTH, HAVE YOU WISHED YOU WERE DEAD OR WISHED YOU COULD GO TO SLEEP AND NOT WAKE UP?: YES
REASONS FOR IDEATION LIFETIME: MOSTLY TO END OR STOP THE PAIN (YOU COULDN'T GO ON LIVING WITH THE PAIN OR HOW YOU WERE FEELING)
6. HAVE YOU EVER DONE ANYTHING, STARTED TO DO ANYTHING, OR PREPARED TO DO ANYTHING TO END YOUR LIFE?: NO
4. HAVE YOU HAD THESE THOUGHTS AND HAD SOME INTENTION OF ACTING ON THEM?: YES
TOTAL  NUMBER OF INTERRUPTED ATTEMPTS PAST 3 MONTHS: NO
1. IN THE PAST MONTH, HAVE YOU WISHED YOU WERE DEAD OR WISHED YOU COULD GO TO SLEEP AND NOT WAKE UP?: YES
3. HAVE YOU BEEN THINKING ABOUT HOW YOU MIGHT KILL YOURSELF?: YES
2. HAVE YOU ACTUALLY HAD ANY THOUGHTS OF KILLING YOURSELF?: NO
5. HAVE YOU STARTED TO WORK OUT OR WORKED OUT THE DETAILS OF HOW TO KILL YOURSELF? DO YOU INTEND TO CARRY OUT THIS PLAN?: NO
2. HAVE YOU ACTUALLY HAD ANY THOUGHTS OF KILLING YOURSELF LIFETIME?: YES
ATTEMPT PAST THREE MONTHS: NO
5. HAVE YOU STARTED TO WORK OUT OR WORKED OUT THE DETAILS OF HOW TO KILL YOURSELF? DO YOU INTEND TO CARRY OUT THIS PLAN?: NO
TOTAL  NUMBER OF INTERRUPTED ATTEMPTS LIFETIME: NO
1. IN YOUR LIFETIME, HAVE YOU WISHED YOU WERE DEAD OR WISHED YOU COULD GO TO SLEEP AND NOT WAKE UP?: SEE BELOW
ATTEMPT LIFETIME: NO
6. HAVE YOU EVER DONE ANYTHING, STARTED TO DO ANYTHING, OR PREPARED TO DO ANYTHING TO END YOUR LIFE?: NO
4. HAVE YOU HAD THESE THOUGHTS AND HAD SOME INTENTION OF ACTING ON THEM?: NO
REASONS FOR IDEATION PAST MONTH: MOSTLY TO END OR STOP THE PAIN (YOU COULDN'T GO ON LIVING WITH THE PAIN OR HOW YOU WERE FEELING)
2. HAVE YOU ACTUALLY HAD ANY THOUGHTS OF KILLING YOURSELF?: SEE BELOW

## 2020-03-17 NOTE — PROGRESS NOTES
"[facility]    PATIENT'S NAME: Werner Harrison  PREFERRED NAME: Edwige  PREFERRED PRONOUNS: She/Her/Hers/Herself  MRN:   [id number]  :   1980  [id number]  DATE OF SERVICE: 3/17/20  START TIME: ***  END TIME: ***  PREFERRED PHONE: ***  May we leave a program related message: {YES-NO  Default Yes:4444::\"Yes\"}    STANDARD DIAGNOSTIC ASSESSMENT    VIDEO VISIT: {OP BEH Video Visit:233277}    Identifying Information:  Patient is a 39 year old, {RACES:342055}.  The pronoun use throughout this assessment reflects the sex of the patient at birth.  Patient was referred for an assessment by self and *** [facility].  Patient attended the session alone.     The patient describes their cultural background as ***.  Cultural influences and impact on patient's life structure, values, norms, and healthcare: ***.  The patient reports there {CULTURAL FACTORS:608837}.  Patient identified her preferred language to be {LANGUAGES SPOKEN:560272}. Patient reported she does not need the assistance of an  or other support involved in therapy. Modifications will not be used to assist communication in therapy.   Patient reports she is able to understand written materials.    Chief Complaint:   The reason for seeking services at this time is: seeking support for depression symptoms.    Patient reports experiencing depression symptoms for the last 8 years and has attempted to manage on her own. Patient reports she noted in the last 3 months that her symptoms have been impacting her ability to function at work which has led her to seek services.    Patient reports feeling extremely down, depressed, and feeling constantly tired, and feels hopeless. Patient reports after going to work feeling so worn out and not wanting to do anything. Patient reports getting tearful most days, has difficulty falling asleep, has lost weight, and has difficulty focusing. Patient reports feeling bitterness " "towards others and has a sense that she won't ever be able to \"catch a break.\" Patient reports she has no friends, and has chosen to not pursue friendships due to being hurt in previous friendships. Patient reports she has also isolated herself from functions with her 's family and has only attended major events such as [place] in the last three years.    Patient reports current passive suicidal thoughts of, \"what am I here for?\" and \"why am I being put through this?\" Patient reports also having feelings of worthlessness and uselessness, and experiencing thoughts of \"its not worth me being here.\" Patient denies considering methods of suicide in the last 3 years. See CSSR for further history information.      Patient reports the current stressors: work performance, stress her  experiences.    History of Presenting Concern:  The problem(s) began in childhood.    Patient reports experiencing an \"identity crisis\" at age 30 or 31. Patient reports at the time she experienced a lot of stress in her family, felt obligated to financially support her family members, and felt culturally pressured to get . Patient reports the stress of this time has never really resolved.     Patient reports depression has been ongoing for the last 8 years since moving to the [place].    Patient also reports history of panic attacks, some which led her to seek emergency services. Patient reports learning about strategies to manage panic attacks independently through reading books, and has not had a serious panic attack in the last year. Patient reports in the last year she does experience heightened anxiety in which she fells her hands clench and her body goes numb, though reports she is better able to manage those and has not needed emergency services.       Patient has attempted to resolve these concerns in the past through talking with primary care, talking with , tried an informal counselor in the past, " "focuses on eating mindfully, treating Vitamin D deficiency. Patient reports that other professional(s) are not currently involved in providing support / services.      Social/Family History:  Patient reported she grew up in {Location[place]}.  They were raised by {OP BEH FAMILY:189923}.  They were the {BIRTH ORDER:[birth year]} born of *** children.  {OP BEH INTACT FAMILY:593583} Patient reported that her childhood was ***.  Patient described her current relationships with family of origin as ***.      Patient reported in her childhood she moved around a lot due to her father's work. Patient reports she attended a boarding school from a young age for 10 years.        Patient reports she has lived in the [place] for the last 8 years and has felt depressed for most of this time.    Patient's highest education level was {EDUCATIONAL LEVEL:371046}. Patient {LEARNING PROBLEM:737695}.     Patient reported the following relationship history ***.  Patient's current relationship status is  for ***.   Patient identified their sexual orientation as {OP BEH SEXUAL ORIENTATION:389178}.  Patient reported having 0 children. Patient reports she has 1 dog. ***    Patient's current living/housing situation involves {OP BEH HOUSIN}.  Patient identified {OP BEH SUPPORT SYSTEM:391765} as part of their support system.  Patient identified the quality of these relationships as {OP BEH SUPPORT SYSTEM QUALITY:371553}.      Patient is currently employed part time and reports they are not able to function appropriately at work. Patient reports in the last months it has been more difficulty to put on her \"smiling\" face at work. 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  Patient {DID:791290::\"did not\"} serve in the .  Patient reports their finances are obtained through {OP BEH FINANCIAL SOURCE:112002}.  Patient {OP BEH DOES/DOES NOT:891990} identify finances as a current stressor.      Patient reported that she " "{HAS:324904} been involved with the legal system.  *** Patient {OP BEH :537428}.    Medical Issues:  Patient reports family history includes Diabetes in her father and mother.    Patient {HAS:323364} had a physical exam to rule out medical causes for current symptoms.  Date of last physical exam was {DATE OF LAST PHYSICAL EXAM:399840}. The patient {PCP:117367}.  Patient reports {Current Medical Concerns:622614}.  They {DID:429175::\"did not\"} report dental concerns.  There {ARE:442194} significant appetite / nutritional concerns / weight changes.  The patient {HAS:806199} been diagnosed with an eating disorder.  The patient {Presence of Pain:268145}.  Patient {does/does not:615754} report a history of head injury / trauma / cognitive impairment.  ***    {Current meds:097979}    Medication Adherence:  Patient reports {TAKING PRESCRIBED:570052}    Patient Allergies:  No Known Allergies    Medical History:  Past Medical History:   Diagnosis Date     Irregular menses        Mental Health History:  Patient {DID:537639::\"did not\"} report a family history of mental health concerns; see medical history section for details.  Patient previously received the following mental health diagnosis: {MENTAL HEALTH DIAGNOSES:231797}.  Patient reported symptoms began ***.   Patient has received the following mental health services in the past: {OP BEH PAST  SERVICES:020382}.  Hospitalizations: {OP BEH HOSPITALS:701789}.  Patient {OP BEH CIVIL COMMITTMENT:799994}.  Patient {OP BEH RECEIVING MENTAL HEALTH:263385}.   ***     Current Mental Status Exam:   Appearance:  {Appearance:512701::\"Appropriate \"}  Eye Contact:  {Eye Contact:719598::\"Good \"}  Psychomotor:  {Psychomotor:498849::\"Normal \"}      Gait / station:  {Gait:5276454}  Attitude / Demeanor: {Attitude:510125::\"Cooperative \"}  Speech      Rate / Production: {Rate/Production:605902::\"Normal \"}      Volume:  {Volume:752142::\"Normal \"} volume      " "Language:  {Language:065744}  Mood:   {Mood:168266::\"Normal\"}  Affect:   {Affect:101721::\"Appropriate \"}  Thought Content: {Thought Content:578487::\"Clear \"}  Thought Process: {Thought Form:983583::\"Coherent \",\"Logical \"}      Associations: {Associations:346898}  Insight:   {Insight:970235::\"Good \"}  Judgment:  {Judgment:471317::\"Intact \"}  Orientation:  {Orientation:200782::\"All\"}  Attention/concentration: {Attention:087164}      Review of Symptoms:  Depression: {OP BEH SYMPTOMS DEPRESSION:087039::\"No symptoms\"}  Wallace:  {OP BEH SYMPTOMS WALLACE:215158}  Psychosis: {OP BEH SYMPTOMS PSYCHOSIS:270226}  Anxiety: {OP BEH SYMPTOMS ANXIETY:093440}  Panic:  {OP BEH FCC SYMPTOMS PANIC:147357}  Post Traumatic Stress Disorder: {OP BEH SYMPTOMS PTSD:216488}  Eating Disorder: {OP BEH SYMPTOMS EATING D/O:627448}  Oppositional Defiant Disorder:  {OP BEH SYMPTOMS ODD:561340}  ADD / ADHD:  {OP BEH SYMPTOMS ADHD:042535}  Conduct Disorder: {OP BEH SYMPTOMS CONDUCT DISORDER:632185}  Autism Spectrum Disorder: {OP BEH AUTISM SPECTRUM:936102}  Obsessive Compulsive Disorder: {OP BEH SYMPTOMS OCD:736971}  Other Compulsive Behaviors: {OP BEH COMPULSIVE BEHAVIORS:456587}   Substance Use: {OP BEH SUBSTANCE USE SYMPTOMS:741265}    Rating Scales:  PHQ9     PHQ-9 SCORE 2/14/2020   PHQ-9 Total Score 20     GAD7     LAURA-7 SCORE 2/14/2020   Total Score 16     CGI   Clinical Global Impressions  Initial result:     Most recent result:       Substance Use History:  Patient {DID:353822::\"did not\"} report a family history of substance use concerns; see medical history section for details.  Patient {RECEIVED CHEMICAL DEPENDENCY TREATMENT:785528}.  Patient {HAS HAS NOT:211920} ever been to detox.      Patient {RECEIVING CHEMICAL DEPENDENCY TREATMENT:975369}. Patient reported the following problems as a result of their substance use: {DRINKING PROBLEMS LISTED:592861}.     Patient {REPORTS ALCOHOL:452487}  Patient {REPORTS TOBACCO:372670}.  Patient {REPORTS " "MARIJUANA:395834}  Patient {REPORTS CAFFEINE:772591}  Patient {OP BEH COCAINE/CRACK:493188}  Patient {OP BEH METH/AMPHETAMINES:569572}  Patient {OP BEH HEROIN:833313}  Patient {OP BEH OPIATES:287226}  Patient {OP BEH INHALANTS:820285}  Patient {OP BEH BENZO:593518}  Patient {OP BEH HALLUCINOGEN:111940}  Patient {OP BEH BARBS:995927}  Patient {OP BEH OTC:485418}  Patient {OP BEH OTHER DRU}    No flowsheet data found.    {OP BEH CD:997231}      Based on the {Positive / Negative:571660} CAGE score and clinical interview there  {Indications:989460}.    Significant Losses / Trauma / Abuse / Neglect Issues:   There are {LOSS / TRAUMA:069663}    Concerns for possible neglect {Neglect:371822}. ***    Safety Assessment:  Current Safety Concerns:  Arthur Suicide Severity Rating Scale (Lifetime/Recent)  Arthur Suicide Severity Rating (Lifetime/Recent) 3/17/2020   1. Wish to be Dead (Lifetime) Yes   Wish to be Dead Description (Lifetime) See below   1. Wish to be Dead (Recent) Yes   Wish to be Dead Description (Recent) Patient reports current passive suicidal thoughts of, \"what am I here for?\" and \"why am I being put through this?\" Patient reports also having feelings of worthlessness and uselessness, and experiencing thoughts of \"its not worth me being here.\"   2. Non-Specific Active Suicidal Thoughts (Lifetime) Yes   Non-Specific Active Suicidal Thought Description (Lifetime) See below   2. Non-Specific Active Suicidal Thoughts (Recent) No   3. Active Suicidal Ideation with any Methods (Not Plan) Without Intent to Act (Lifetime) Yes   Active Suicidal Ideation with any Methods (Not Plan) Description (Lifetime) Patient reports suicidal thoughts began in the 8th grade [place]. Patient reports experiencing thoughts of \"maybe it'd be good if I jumped off the mountain.\" Patient denies taking steps towards these thoughts reporting she became scared when she thought of how " "her parents would react.    3. Active Sucidal Ideation with any Methods (Not Plan) Without Intent to Act (Recent) No   4. Active Suicidal Ideation with Some Intent to Act, Without Specific Plan (Lifetime) Yes   Active Suicidal Ideation with Some Intent to Act, Without Specific Plan Description (Lifetime) Patient reports 2-3 years ago having urges to \"see how I would feel\" about the idea of taking a step towards an oncoming car or the road. Patient reports she has taken a step towards cars/the road, but only when in the presence of her  or friend so they can pull her back. Patient denies that this was a suicide attempt, rather patient reports she was curious how she would feel. Patient reported she became afraid and realized she would not want to do that. Patient denies this has occurred in the last month.    4. Active Suicidal Ideation with Some Intent to Act, Without Specific Plan (Recent) No   5. Active Suicidal Ideation with Specific Plan and Intent (Lifetime) No   5. Active Suicidal Ideation with Specific Plan and Intent (Recent) No   Most Severe Ideation Rating (Lifetime) 3   Most Severe Ideation Description (Lifetime) Patient reports 2-3 years ago having urges to \"see how I would feel\" about the idea of taking a step towards an oncoming car or the road. Patient reports she has taken a step towards cars/the road, but only when in the presence of her  or friend so they can pull her back. Patient denies that this was a suicide attempt, rather patient reports she was curious how she would feel. Patient reported she became afraid and realized she would not want to do that. Patient denies this has occurred in the last month.    Frequency (Lifetime) 3   Duration (Lifetime) 3   Controllability (Lifetime) 3   Protective Factors  (Lifetime) 2   Reasons for Ideation (Lifetime) 4   Most Severe Ideation Rating (Past Month) 2   Most Severe Ideation Description (Past Month) Patient reports current passive " "suicidal thoughts of, \"what am I here for?\" and \"why am I being put through this?\" Patient reports also having feelings of worthlessness and uselessness, and experiencing thoughts of \"its not worth me being here.\"   Frequency (Past Month) 2   Duration (Past Month) 2   Controllability (Past Month) 3   Protective Factors (Past Month) 2   Reasons for Ideation (Past Month) 4   Actual Attempt (Lifetime) No   Actual Attempt (Past 3 Months) No   Has subject engaged in non-suicidal self-injurious behavior? (Lifetime) No   Has subject engaged in non-suicidal self-injurious behavior? (Past 3 Months) No   Interrupted Attempts (Lifetime) No   Interrupted Attempts (Past 3 Months) No   Aborted or Self-Interrupted Attempt (Lifetime) No   Aborted or Self-Interrupted Attempt (Past 3 Months) No   Preparatory Acts or Behavior (Lifetime) No   Preparatory Acts or Behavior (Past 3 Months) No     Patient {HOMICIDAL IDEATION:965846}  Patient {SELF-INJURIOUS IDEATION:069153}  Patient {OP BEH SHAREE RISK BEHAVIORS:593039} associated with substance use.  Patient {OP BEH MH RISK BEHAVIORS:441422} associated with mental health symptoms.  Patient reports the following current concerns for their personal safety: {PERSONAL SAFETY CONCERNS:987677}.  Patient reports there are {Firearms:450623}.     History of Safety Concerns:  Patient {History of homicidal ideation:896119}   Patient {History of self-injurious ideation:501851}  Patient {History of personal safety concerns:725635}  Patient {OP BEH ASSAULT HISTORY:684687}  Patient {OP BEH SEXUAL OFFENSE HISTORY:464914}  Patient {OP BEH SHAREE HISTORY RISK BEHAVIORS:366974} associated with substance use.  Patient {OP BEH MH HISTORY RISK BEHAVIORS:350971} associated with mental health symptoms.    Patient reports the following protective factors: positive relationships positive family connections, forward/future oriented thinking, dedication to family/friends, safe and stable environment, regular physical " "activity, agreement to use safety plan, living with other people, daily obligations, structured day, effective problem-solving skills, committment to well-being and healthy fear of risky behaviors or pain    See Preliminary Treatment Plan for Safety and Risk Management Plan    Patient's Strengths and Limitations:  Patient identified the following strengths or resources that will help {HIM OR HER:294081} succeed in treatment: {OP BEH SUCCEED:198599}. Things that may interfere with the patient's success in treatment include: {OP BEH INTERFERE:905394}.     Diagnostic Criteria:  {OP BEH DSM 5 Criteria:645362}    Functional Status:  Patient's  symptoms have resulted in the following functional impairments: {SELF-REPORTED FUNCTIONAL IMPAIRMENTS:067291}    DSM5 Diagnoses: (Sustained by DSM5 Criteria Listed Above)  Diagnoses: {DSM5 MH Diagnosis:684069}  Psychosocial & Contextual Factors: ***  WHODAS: No flowsheet data found.    Preliminary Treatment Plan:  Plan for Safety and Risk Management:   A safety and risk management plan has been developed including: Patient consented to co-developed safety plan.  Safety and risk management plan was completed.  Patient agreed to use safety plan should any safety concerns arise.  A copy was given to the patient.     Collaboration:  {OP BEH COLLABORATION:848767}.    {Referral to Professional:663763}.  {NILES:527439}.     Patient's identified {OP BEH IDENTIFIED ISSUES:092911}    Initial Treatment will focus on: {Preliminary Focus:394268}.     Resources/Service Plan:       services {Not indicated / Used:767166}.     Modifications to assist communication {Not indicated / Used:749555}.     Additional disability accomodations {OP BEH ACCOMODATIONS:763634}     Discussed {Alcohol:760557}. Provider gave patient printed information about the effects of chemical use on her health and well being.    Records {OP BEH RECORD REVIEW:841653::\"were not available for review\"} at time of " assessment.    Report to child / adult protection services was {Completed:772570}.    Information in this assessment was obtained from the medical record and provided by {PATIENT. FAMILY:362852} who is a {h:670217} historian.     {Access to Records:205174}.    Madelin Vences, [facility]  March 17, 2020

## 2020-04-02 ENCOUNTER — VIRTUAL VISIT (OUTPATIENT)
Dept: PSYCHOLOGY | Facility: CLINIC | Age: 40
End: 2020-04-02
Payer: COMMERCIAL

## 2020-04-02 DIAGNOSIS — F41.1 GAD (GENERALIZED ANXIETY DISORDER): ICD-10-CM

## 2020-04-02 DIAGNOSIS — F33.2 SEVERE EPISODE OF RECURRENT MAJOR DEPRESSIVE DISORDER, WITHOUT PSYCHOTIC FEATURES (H): Primary | ICD-10-CM

## 2020-04-02 PROCEDURE — 90791 PSYCH DIAGNOSTIC EVALUATION: CPT | Mod: 95 | Performed by: SOCIAL WORKER

## 2020-04-02 ASSESSMENT — ANXIETY QUESTIONNAIRES
5. BEING SO RESTLESS THAT IT IS HARD TO SIT STILL: MORE THAN HALF THE DAYS
1. FEELING NERVOUS, ANXIOUS, OR ON EDGE: NEARLY EVERY DAY
GAD7 TOTAL SCORE: 17
7. FEELING AFRAID AS IF SOMETHING AWFUL MIGHT HAPPEN: MORE THAN HALF THE DAYS
6. BECOMING EASILY ANNOYED OR IRRITABLE: NEARLY EVERY DAY
2. NOT BEING ABLE TO STOP OR CONTROL WORRYING: MORE THAN HALF THE DAYS
3. WORRYING TOO MUCH ABOUT DIFFERENT THINGS: MORE THAN HALF THE DAYS
IF YOU CHECKED OFF ANY PROBLEMS ON THIS QUESTIONNAIRE, HOW DIFFICULT HAVE THESE PROBLEMS MADE IT FOR YOU TO DO YOUR WORK, TAKE CARE OF THINGS AT HOME, OR GET ALONG WITH OTHER PEOPLE: VERY DIFFICULT

## 2020-04-02 ASSESSMENT — PATIENT HEALTH QUESTIONNAIRE - PHQ9
5. POOR APPETITE OR OVEREATING: NEARLY EVERY DAY
SUM OF ALL RESPONSES TO PHQ QUESTIONS 1-9: 22

## 2020-04-02 NOTE — Clinical Note
[facility],    Your patient presented to [facility] for a diagnostic evaluation today.  They will be beginning individual therapy with me.      Please do not hesitate to contact me if you have any questions in regards to Werner Harrison's care.        Madelin Vences, [facility]

## 2020-04-02 NOTE — PROGRESS NOTES
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 and Contextual Factors: Born in [place], grew up in [place], attended boarding school in [facility] ages 9-18, trauma history, stress navigating cultural ways of communication, experience of \"identity crisis\"  3. As evidenced by self report and criteria, client meets the following DSM5 Diagnoses:   (Sustained by DSM5 Criteria Listed Above)  296.33 (F33.2) Major Depressive Disorder, Recurrent Episode, Severe _  300.02 (F41.1) Generalized Anxiety Disorder.  Other Diagnoses that is relevant to services: N/A.  4. R/O: 309.81 (F43.10) Posttraumatic Stress Disorder (includes Posttraumatic Stress Disorder for Children 6 Years and Younger)  Without dissociative symptoms due to patient's trauma history and endorsement of some symptoms .   5. Provisional Diagnosis:  N/A.  6. Prognosis: Expect Improvement.  7. Likely consequences of symptoms if not treated: worsening of functioning, increase of depression symptoms, more social isolation.  8. Client strengths include:  caring, educated, employed, goal-focused, insightful, intelligent, supportive and willing to ask questions .     Recommendations: "     1. Plan for Safety and Risk Management:A safety and risk management plan has been developed including: Patient consented to co-developed safety plan.  Safety and risk management plan was completed.  Patient agreed to use safety plan should any safety concerns arise.  A copy was given to the patient..  Report to child / adult protection services was NA.     2. Patient's identified cultural concerns will be addressed by discussing patient's experience of culture, supporting patient to name cultural trauma.     3. Initial Treatment will focus on: Depressed Mood - reducing symptoms of depression, increasing coping and functioning  Anxiety - decreasing anxiety experience of panic symptoms.     4. Resources/Service Plan:       services are not indicated.     Modifications to assist communication are not indicated.     Additional disability accommodations are not indicated.      5. Collaboration:  Collaboration / coordination of treatment will be initiated with the following support professionals: primary care physician.      6.  Referrals:  The following referral(s) will be initiated: No referrals at this time. Next Scheduled Appointment: 4/16/2020 with [facility].  A Release of Information has been obtained for the following: None.    7. SHAREE: SHAREE:  Discussed the general effects of drugs and alcohol on health and well-being. Provider gave patient printed information about the effects of chemical use on their health and well being. Recommendations:  None at this time.     8. Records were not available for review at time of assessment.  Information in this assessment was obtained from the medical record and provided by patient who is a good historian.   Patient will have open access to their mental health medical record.      Eval type:  Mental Health

## 2020-04-03 ASSESSMENT — ANXIETY QUESTIONNAIRES: GAD7 TOTAL SCORE: 17

## 2020-04-16 ENCOUNTER — VIRTUAL VISIT (OUTPATIENT)
Dept: PSYCHOLOGY | Facility: CLINIC | Age: 40
End: 2020-04-16
Payer: COMMERCIAL

## 2020-04-16 DIAGNOSIS — F41.1 GAD (GENERALIZED ANXIETY DISORDER): ICD-10-CM

## 2020-04-16 DIAGNOSIS — F33.2 SEVERE EPISODE OF RECURRENT MAJOR DEPRESSIVE DISORDER, WITHOUT PSYCHOTIC FEATURES (H): Primary | ICD-10-CM

## 2020-04-16 PROCEDURE — 90834 PSYTX W PT 45 MINUTES: CPT | Mod: 95 | Performed by: SOCIAL WORKER

## 2020-04-16 NOTE — PROGRESS NOTES
Progress Note    Patient Name: Werner Harrison  Date: 4/16/2020         Service Type: Individual      Session Start Time: 3:00pm  Session End Time: 3:50pm     Session Length: 50min    Session #: 3    Attendees: Client attended alone    Telemedicine Visit: The patient's condition can be safely assessed and treated via synchronous audio and visual telemedicine encounter.      Reason for Telemedicine Visit: Services only offered telehealth    Originating Site (Patient Location): Patient's home    Distant Site (Provider Location): Provider Remote Setting    Consent:  The patient/guardian has verbally consented to: the potential risks and benefits of telemedicine (video visit) versus in person care; bill my insurance or make self-payment for services provided; and responsibility for payment of non-covered services.      Treatment Plan Last Reviewed: 4/16/2020  PHQ 2/14/2020 4/2/2020   PHQ-9 Total Score 20 22   Q9: Thoughts of better off dead/self-harm past 2 weeks Several days Several days     LAURA-7 SCORE 2/14/2020 4/2/2020   Total Score 16 17         DATA  Interactive Complexity: No  Crisis: No       Progress Since Last Session (Related to Symptoms / Goals / Homework):   Symptoms: No change Patient reports ongoing stress at work which has led her to feeling overwhelmed in the last weeks    Homework: N/A-none assigned at diagnostic session      Episode of Care Goals: Satisfactory progress - PREPARATION (Decided to change - considering how); Intervened by negotiating a change plan and determining options / strategies for behavior change, identifying triggers, exploring social supports, and working towards setting a date to begin behavior change     Current / Ongoing Stressors and Concerns:   Many demands at work, impact of COVID-19, trauma history     Treatment Objective(s) Addressed in This Session:   Rapport building  Treatment planning   Patient will learn 2-4 facts about  depression, anxiety, and how emotions manifest.   Patient will learn 2-4 coping skills to utilize when feeling stress, a strong emotion, or when needing to enhance safety.   Intervention:     Treatment planning/motivational interviewing/CBT: Therapist gathered from patient her hopes for treatment and developed treatment plan. Therapist reflected with patient on strategies she has learned in a mindfulness class to manage stress as well as previously learned coping skills. Therapist provided education to patient regarding the window of tolerance as well as basic brain science regarding emotion.    Therapist taught patient to ground by placing her hand on her heart. Therapist tracked with patient the effect of this skill. Therapist taught patient to track her response to deep breathing. Therapist taught patient progressive muscle relaxation and practiced in session.      ASSESSMENT: Current Emotional / Mental Status (status of significant symptoms):   Risk status (Self / Other harm or suicidal ideation)   Patient denies current fears or concerns for personal safety.   Patient reports the following current or recent suicidal ideation or behaviors: patient reports intermittent passive suicidal ideations. Patient reports feeling she can maintain her safety and agrees to utilize crisis resources if needed..   Patient denies current or recent homicidal ideation or behaviors.   Patient denies current or recent self injurious behavior or ideation.   Patient denies other safety concerns.   Patient reports there has been no change in risk factors since their last session.     Patient reports there has been no change in protective factors since their last session.     A safety and risk management plan has been developed including: Patient consented to co-developed safety plan.  Safety and risk management plan was completed.  Patient agreed to use safety plan should any safety concerns arise.  A copy was given to the  "patient.     Appearance:   Appropriate    Eye Contact:   Fair    Psychomotor Behavior: Normal    Attitude:   Cooperative  Interested   Orientation:   All   Speech    Rate / Production: Normal/ Responsive    Volume:  Normal    Mood:    Anxious  Sad    Affect:    Constricted  Worrisome    Thought Content:  Clear    Thought Form:  Coherent  Logical    Insight:    Good      Medication Review:   No changes to current psychiatric medication(s)     Medication Compliance:   No Patient reports she wishes to try therapy before trying medication management for depression      Changes in Health Issues:   None reported     Chemical Use Review:   Substance Use: Chemical use reviewed, no active concerns identified      Tobacco Use: No current tobacco use.      Diagnosis:  1. Severe episode of recurrent major depressive disorder, without psychotic features (H)    2. LAURA (generalized anxiety disorder)        Collateral Reports Completed:   Not Applicable      PLAN: (Patient Tasks / Therapist Tasks / Other)  Patient to practice grounding by placing a hand on the heart, breathing exercises she has learned through attending a class, and progressive muscle relaxation.        Madelin Vences, [facility]                                                         ______________________________________________________________________    Treatment Plan    Patient's Name: Werner Harrison  YOB: 1980    Date: 4/16/2020    DSM5 Diagnoses: 296.32 (F33.1) Major Depressive Disorder, Recurrent Episode, Moderate _ or 300.02 (F41.1) Generalized Anxiety Disorder  Psychosocial / Contextual Factors: Born in [place], grew up in [place], attended boarding school in [facility] ages 9-18, trauma history, stress navigating cultural ways of communication, experience of \"identity crisis\" per patient report  WHODAS 2.0 Total Score 4/2/2020   Total Score 32         Referral / Collaboration:  Referral to another professional/service is not indicated at this " time..    Anticipated number of session or this episode of care: 12-16      MeasurableTreatment Goal(s) related to diagnosis / functional impairment(s)  Goal 1: Patient will reduce experience of depression by half as measured by the PHQ9 from a score of 22 to 11 or less.    I will know I've met my goal when I'm not getting upset at home.      Objective #A     Patient will learn 2-4 facts about depression, anxiety, and how emotions manifest.  Status: New - Date: 4/16/2020     Intervention(s)  Therapist will provide psychoeducation and CBT.    Objective #B  Patient will learn 2-4 coping skills to utilize when feeling stress, a strong emotion, or when needing to enhance safety.  Status: New - Date: 4/16/2020     Intervention(s)  Therapist will utilize CBT, sensorimotor psychotherapy and DBT to teach coping skills and develop resources for stress.    Objective #C   Patient will learn 1-3 cognitive coping skills and skills to address beliefs about self.  Status: New - Date: 4/16/2020     Intervention(s)  Therapist will provide CBT and Sensorimotor psychotherapy to support patient's development of healthy thoughts and healthy beliefs about herself.    Objective #D  Patient will learn 1-3 skills to increase ease and confidence in communicating to others, and increase self-confidence.   Status: New - Date: 4/16/2020     Intervention(s)  Therapist will utilize CBT to support development of coping thoughts regarding social interactions as well as use role play to practice communication skills.      Patient has reviewed and agreed to the above plan.      Madelin Vences, [facility]  April 16, 2020

## 2020-04-17 NOTE — PATIENT INSTRUCTIONS
"Alexander Gibbons,    I've copied below your initial treatment plan. Please let me know if you have questions or would like to see any changes or additions.    Thanks,  Madelin        Treatment Plan    Patient's Name: Werner Harrison  YOB: 1980    Date: 4/16/2020    DSM5 Diagnoses: 296.32 (F33.1) Major Depressive Disorder, Recurrent Episode, Moderate _ or 300.02 (F41.1) Generalized Anxiety Disorder  Psychosocial / Contextual Factors: Born in [place], grew up in [place], attended boarding school in [facility] ages 9-18, trauma history, stress navigating cultural ways of communication, experience of \"identity crisis\" per patient report  WHODAS 2.0 Total Score 4/2/2020   Total Score 32         Referral / Collaboration:  Referral to another professional/service is not indicated at this time..    Anticipated number of session or this episode of care: 12-16      MeasurableTreatment Goal(s) related to diagnosis / functional impairment(s)  Goal 1: Patient will reduce experience of depression by half as measured by the PHQ9 from a score of 22 to 11 or less.    I will know I've met my goal when I'm not getting upset at home.      Objective #A     Patient will learn 2-4 facts about depression, anxiety, and how emotions manifest.  Status: New - Date: 4/16/2020     Intervention(s)  Therapist will provide psychoeducation and CBT.    Objective #B  Patient will learn 2-4 coping skills to utilize when feeling stress, a strong emotion, or when needing to enhance safety.  Status: New - Date: 4/16/2020     Intervention(s)  Therapist will utilize CBT, sensorimotor psychotherapy and DBT to teach coping skills and develop resources for stress.    Objective #C   Patient will learn 1-3 cognitive coping skills and skills to address beliefs about self.  Status: New - Date: 4/16/2020     Intervention(s)  Therapist will provide CBT and Sensorimotor psychotherapy to support patient's development of healthy thoughts and healthy beliefs about " herself.    Objective #D  Patient will learn 1-3 skills to increase ease and confidence in communicating to others, and increase self-confidence.   Status: New - Date: 4/16/2020     Intervention(s)  Therapist will utilize CBT to support development of coping thoughts regarding social interactions as well as use role play to practice communication skills.      Patient has reviewed and agreed to the above plan.      Madelin Vences, [facility]  April 16, 2020

## 2020-04-30 ENCOUNTER — VIRTUAL VISIT (OUTPATIENT)
Dept: PSYCHOLOGY | Facility: CLINIC | Age: 40
End: 2020-04-30
Payer: COMMERCIAL

## 2020-04-30 DIAGNOSIS — F41.1 GAD (GENERALIZED ANXIETY DISORDER): ICD-10-CM

## 2020-04-30 DIAGNOSIS — F33.2 SEVERE EPISODE OF RECURRENT MAJOR DEPRESSIVE DISORDER, WITHOUT PSYCHOTIC FEATURES (H): Primary | ICD-10-CM

## 2020-04-30 PROCEDURE — 90834 PSYTX W PT 45 MINUTES: CPT | Mod: GT | Performed by: SOCIAL WORKER

## 2020-04-30 NOTE — PROGRESS NOTES
Progress Note    Patient Name: Werner Harrison  Date: 4/30/2020         Service Type: Individual      Session Start Time: 3:00pm  Session End Time: 3:47pm     Session Length: 47min    Session #: 4    Attendees: Client attended alone    Telemedicine Visit: The patient's condition can be safely assessed and treated via synchronous audio and visual telemedicine encounter.      Reason for Telemedicine Visit: Services only offered telehealth    Originating Site (Patient Location): Patient's home    Distant Site (Provider Location): Provider Remote Setting    Consent:  The patient/guardian has verbally consented to: the potential risks and benefits of telemedicine (video visit) versus in person care; bill my insurance or make self-payment for services provided; and responsibility for payment of non-covered services.      Treatment Plan Last Reviewed: 4/16/2020  PHQ 2/14/2020 4/2/2020   PHQ-9 Total Score 20 22   Q9: Thoughts of better off dead/self-harm past 2 weeks Several days Several days     LAURA-7 SCORE 2/14/2020 4/2/2020   Total Score 16 17         DATA  Interactive Complexity: No  Crisis: No       Progress Since Last Session (Related to Symptoms / Goals / Homework):   Symptoms: No change Patient reports anxiety regarding the pandemic, her parent's well being in light of the pandemic, her work and her 's work    Homework: Partially completed      Episode of Care Goals: Satisfactory progress - PREPARATION (Decided to change - considering how); Intervened by negotiating a change plan and determining options / strategies for behavior change, identifying triggers, exploring social supports, and working towards setting a date to begin behavior change     Current / Ongoing Stressors and Concerns:   Many demands at work, impact of COVID-19, trauma history     Treatment Objective(s) Addressed in This Session:   Rapport building  Treatment planning   Patient will learn 2-4  facts about depression, anxiety, and how emotions manifest.   Patient will learn 2-4 coping skills to utilize when feeling stress, a strong emotion, or when needing to enhance safety.   Intervention:     Sensorimotor psychotherapy/internal family systems: Patient noted multiple anxieties of the last weeks. Therapist provided reflection and containment. Patient raised questions regarding her experience of nightmares and shared recurring topics and themes in her dreams. Therapist provided psychoeducation regarding traumatic themes in dreams and opportunities to process stressful and traumatic incidents to reduce stressful dreams.   Therapist worked with patient to identify current emotions in session and practice making space and permission for emotions. Therapist utilize parts language to name with patient the roles and functions of manager/protective parts. Therapist supported patient to explore giving permission for sadness, exploring relevant themes, and exploring grounding/allowing gestures of opening the arms/shoulders.      ASSESSMENT: Current Emotional / Mental Status (status of significant symptoms):   Risk status (Self / Other harm or suicidal ideation)   Patient denies current fears or concerns for personal safety.   Patient denies current or recent suicidal ideation or behaviors.   Patient denies current or recent homicidal ideation or behaviors.   Patient denies current or recent self injurious behavior or ideation.   Patient denies other safety concerns.   Patient reports there has been no change in risk factors since their last session.     Patient reports there has been no change in protective factors since their last session.     A safety and risk management plan has been developed including: Patient consented to co-developed safety plan.  Safety and risk management plan was completed.  Patient agreed to use safety plan should any safety concerns arise.  A copy was given to the  "patient.     Appearance:   Appropriate    Eye Contact:   Fair    Psychomotor Behavior: Normal    Attitude:   Cooperative  Interested   Orientation:   All   Speech    Rate / Production: Normal/ Responsive    Volume:  Normal    Mood:    Anxious  Sad  Grieving   Affect:    Constricted  Tearful   Thought Content:  Clear    Thought Form:  Coherent  Logical    Insight:    Good      Medication Review:   No changes to current psychiatric medication(s)     Medication Compliance:   No Patient reports she wishes to try therapy before trying medication management for depression      Changes in Health Issues:   None reported     Chemical Use Review:   Substance Use: Chemical use reviewed, no active concerns identified      Tobacco Use: No current tobacco use.      Diagnosis:  1. Severe episode of recurrent major depressive disorder, without psychotic features (H)    2. LAURA (generalized anxiety disorder)        Collateral Reports Completed:   Not Applicable      PLAN: (Patient Tasks / Therapist Tasks / Other)  Patient to practice allowing feelings to occur.  Patient to practice containing/grounding gesture of opening the arms and shoulders.        Madelin Vences, [facility]                                                         ______________________________________________________________________    Treatment Plan    Patient's Name: Werner Harrison  YOB: 1980    Date: 4/16/2020    DSM5 Diagnoses: 296.32 (F33.1) Major Depressive Disorder, Recurrent Episode, Moderate _ or 300.02 (F41.1) Generalized Anxiety Disorder  Psychosocial / Contextual Factors: Born in [place], grew up in [place], attended boarding school in [facility] ages 9-18, trauma history, stress navigating cultural ways of communication, experience of \"identity crisis\" per patient report  WHODAS 2.0 Total Score 4/2/2020   Total Score 32         Referral / Collaboration:  Referral to another professional/service is not indicated at this time..    Anticipated " number of session or this episode of care: 12-16      MeasurableTreatment Goal(s) related to diagnosis / functional impairment(s)  Goal 1: Patient will reduce experience of depression by half as measured by the PHQ9 from a score of 22 to 11 or less.    I will know I've met my goal when I'm not getting upset at home.      Objective #A     Patient will learn 2-4 facts about depression, anxiety, and how emotions manifest.  Status: New - Date: 4/16/2020     Intervention(s)  Therapist will provide psychoeducation and CBT.    Objective #B  Patient will learn 2-4 coping skills to utilize when feeling stress, a strong emotion, or when needing to enhance safety.  Status: New - Date: 4/16/2020     Intervention(s)  Therapist will utilize CBT, sensorimotor psychotherapy and DBT to teach coping skills and develop resources for stress.    Objective #C   Patient will learn 1-3 cognitive coping skills and skills to address beliefs about self.  Status: New - Date: 4/16/2020     Intervention(s)  Therapist will provide CBT and Sensorimotor psychotherapy to support patient's development of healthy thoughts and healthy beliefs about herself.    Objective #D  Patient will learn 1-3 skills to increase ease and confidence in communicating to others, and increase self-confidence.   Status: New - Date: 4/16/2020     Intervention(s)  Therapist will utilize CBT to support development of coping thoughts regarding social interactions as well as use role play to practice communication skills.      Patient has reviewed and agreed to the above plan.      Madelin Vences, [facility]  April 16, 2020

## 2020-05-01 ENCOUNTER — MYC MEDICAL ADVICE (OUTPATIENT)
Dept: FAMILY MEDICINE | Facility: CLINIC | Age: 40
End: 2020-05-01

## 2020-05-07 ENCOUNTER — VIRTUAL VISIT (OUTPATIENT)
Dept: FAMILY MEDICINE | Facility: CLINIC | Age: 40
End: 2020-05-07
Payer: COMMERCIAL

## 2020-05-07 DIAGNOSIS — F32.2 SEVERE MAJOR DEPRESSION (H): Primary | ICD-10-CM

## 2020-05-07 DIAGNOSIS — F41.0 PANIC ATTACK: ICD-10-CM

## 2020-05-07 DIAGNOSIS — Z00.00 PREVENTATIVE HEALTH CARE: ICD-10-CM

## 2020-05-07 DIAGNOSIS — F41.1 GAD (GENERALIZED ANXIETY DISORDER): ICD-10-CM

## 2020-05-07 PROCEDURE — 99213 OFFICE O/P EST LOW 20 MIN: CPT | Mod: GT | Performed by: FAMILY MEDICINE

## 2020-05-07 NOTE — PROGRESS NOTES
"Werner Harrison is a 39 year old female who is being evaluated via a billable video visit.      The patient has been notified of following:     \"This video visit will be conducted via a call between you and your physician/provider. We have found that certain health care needs can be provided without the need for an in-person physical exam.  This service lets us provide the care you need with a video conversation.  If a prescription is necessary we can send it directly to your pharmacy.  If lab work is needed we can place an order for that and you can then stop by our lab to have the test done at a later time.    Video visits are billed at different rates depending on your insurance coverage.  Please reach out to your insurance provider with any questions.    If during the course of the call the physician/provider feels a video visit is not appropriate, you will not be charged for this service.\"    Patient has given verbal consent for Video visit? Yes    How would you like to obtain your AVS? [facility]    Patient would like the video invitation sent by: Send to e-mail at: [email or web address]    Will anyone else be joining your video visit? No      Subjective     Werner Harrison is a 39 year old female who presents to clinic today for the following health issues:    HPI     Video Start Time: 9:59 AM    39 year old new to me in clinic today, video visit completed due to covid-19 concerns.  Here for:    Early March of late February went to see the doctor and met with Dr. Kalyan Mcallister.  Was having a lot of depression symptoms.  Been for years is ongoing.      He recommended that she meet with psychologist.  Has been working with Dr. Madelin Vences at [facility].     So has been scheduling her sessions with her during work time.  So needs paperwork so that can go to meetings.  So she encouraged her to get [place] paperwork done.    Has not tried any medications, would like to avoid.  Therapy has " "helped a lot.  Has learned a lot of tools that can use.  Has a lot of resources.  Feels safe.  No suicidal ideation.    Anxiety is off the charge.      Reviewed and updated as needed this visit by Provider  Tobacco  Allergies  Meds  Problems  Med Hx  Surg Hx  Fam Hx         Review of Systems   ROS COMP: Constitutional, HEENT, cardiovascular, pulmonary, gi and gu systems are negative, except as otherwise noted.      Objective    LMP 03/07/2019 (Approximate)   Estimated body mass index is 14.36 kg/m  as calculated from the following:    Height as of 2/14/20: 1.549 m (5' 1\").    Weight as of 2/14/20: 34.5 kg (76 lb).  Physical Exam     GENERAL: healthy, alert and no distress  EYES: Eyes grossly normal to inspection, conjunctivae and sclerae normal  RESP: no audible wheeze, cough, or visible cyanosis.  No visible retractions or increased work of breathing.  Able to speak fully in complete sentences.  NEURO: Cranial nerves grossly intact, mentation intact and speech normal  PSYCH: mentation appears normal, affect flat, tearful, anxious, judgement and insight intact and appearance well groomed      Diagnostic Test Results:  Labs reviewed in Epic        Assessment & Plan     Werner was seen today for forms.    Diagnoses and all orders for this visit:    Severe major depression (H)  Comments:  [place] paperwork completed today.  2 hours per week for therapy.  Also - recommended she consider medications in addition to therapy.  She will consider.    LAURA (generalized anxiety disorder)    Panic attack    Preventative health care  Comments:  Hasn't had pap.  Discussed importance with pt.  She will schedule once covid pandemic eases and is safe.           Patient Instructions   1. I completed and faxed your [facility] paperwork today.  2. Keep working on taking care of you and prioritizing your mental health!  Good work with therapy.  3. Consider medications as well if no better in 3 months, per your agreement with your " therapist.  4. I'll look forward to seeing you for a routine preventative medicine exam (and pap exam) in the fall, when it is safer to resume routine care.  I attached more information about Pap exams below:      Patient Education     Pap  Does this test have other names?  Pap smear, cervical cytology, Papanicolaou test, Pap smear test, vaginal smear technique  What is this test?  This test checks the cells from inside the cervix for any changes that could lead to cancer. The cervix is the lower part of a woman's uterus that opens into the vagina.  This test is named after Mazin Stokes MD, one of the healthcare providers who developed this technique of testing for cervical cancer.  Why do I need this test?  You may need this test as a screening test to look for cervical cancer or changes in cervical cells that might eventually lead to cancer. Major medical groups generally recommend that women get regular Pap tests every 3 years starting at age 21. Getting a regular Pap test can be life-saving. Cervical cancer is one of the most serious types of cancer in women.  If your test shows abnormal cells, your healthcare provider may be able to find and treat cervical problems right away, or stop cervical cancer before it becomes life-threatening. Pap tests can also diagnose serious infections and pelvic inflammation.   What other tests might I have along with this test?  You will likely have a pelvic exam along with this test. Depending on your age and other factors, your tissue samples may also be tested for human papillomavirus (HPV) infection at the same time your Pap test is done. Infection with some types of HPV puts you at risk for cervical cancer.  If you have an abnormal Pap test result, your healthcare provider may order other tests. These may include:    Colposcopy. Your cervix and vagina are looked at with a microscope called a colposcope, which magnifies any abnormal areas.    Endocervical curettage.  Cells are taken from the opening of your cervix with a spoon-shaped tool and looked at under a microscope. This may be done during the colposcopy.    Biopsy. A small tissue sample is taken from your cervix and looked at under a microscope. This may be done during the colposcopy.   What do my test results mean?  Test results may vary depending on your age, gender, health history, the method used for the test, and other things. Your test results may not mean you have a problem. Ask your healthcare provider what your test results mean for you.   Your results will either be normal or abnormal. If you get an abnormal result, this usually does not mean that you have cancer. It often means a minor cervical problem. Your healthcare provider may do another Pap test to confirm the initial results. Or he or she may recommend other tests such as colposcopy.  Occasionally a lab test has a false-positive result. This means you do not have a cervical problem even though the test result shows you do.   How is this test done?  This test is done with a sample of cervical cells. For the test, you lie on your back with your knees bent and your feet in stirrups, then relax and spread your legs. As part of a pelvic exam, your healthcare provider first checks your vagina and reproductive organs for infections and health problems.  Then your provider uses a device called a speculum to open the vagina. The provider examines your cervix and scrapes off a few cells from inside your cervix.  Some women may have slight discomfort when the speculum is inserted.  Does this test pose any risks?  This test poses no known risks.  What might affect my test results?  Using vaginal lubricants, cleansers, contraceptives, or creams may mask your symptoms. Avoid using vaginal douches and abstain from sex for 2 days before an exam. Using these products or having sex may wash away or disguise abnormal cervical cells.  How do I get ready for this test?  It  may seem like a good idea to wash up before having a Pap test, but this can actually erase the signs of a health problem. For accurate test results, avoid having sex or using tampons, douches, vaginal creams, deodorant sprays and powders, and contraceptive foams and jellies for 2 days before your exam.  Don't have the test while you're menstruating. The ideal time to have a Pap test is 10 to 20 days after the first day of your last period.   Be sure your healthcare provider knows about all medicines, herbs, vitamins, and supplements you are taking. This includes medicines that don't need a prescription and any illicit drugs you may use.     2759-3882 [employer]. [address] [place] [address], [place] [address]. All rights reserved. This information is not intended as a substitute for professional medical care. Always follow your healthcare professional's instructions.             Return in about 4 months (around 9/7/2020) for Preventative Annual Visit, Pap.    Yulissa Mcallister MD  [facility]      Video-Visit Details    Type of service:  Video Visit    Video End Time:10:26 AM    Originating Location (pt. Location): Home    Distant Location (provider location):  [facility]     Platform used for Video Visit: AmWell    Return in about 4 months (around 9/7/2020) for Preventative Annual Visit, Pap.       Yulissa Mcallister MD

## 2020-05-07 NOTE — PATIENT INSTRUCTIONS
1. I completed and faxed your [facility] paperwork today.  2. Keep working on taking care of you and prioritizing your mental health!  Good work with therapy.  3. Consider medications as well if no better in 3 months, per your agreement with your therapist.  4. I'll look forward to seeing you for a routine preventative medicine exam (and pap exam) in the fall, when it is safer to resume routine care.  I attached more information about Pap exams below:      Patient Education     Pap  Does this test have other names?  Pap smear, cervical cytology, Papanicolaou test, Pap smear test, vaginal smear technique  What is this test?  This test checks the cells from inside the cervix for any changes that could lead to cancer. The cervix is the lower part of a woman's uterus that opens into the vagina.  This test is named after Mazin Stokes MD, one of the healthcare providers who developed this technique of testing for cervical cancer.  Why do I need this test?  You may need this test as a screening test to look for cervical cancer or changes in cervical cells that might eventually lead to cancer. Major medical groups generally recommend that women get regular Pap tests every 3 years starting at age 21. Getting a regular Pap test can be life-saving. Cervical cancer is one of the most serious types of cancer in women.  If your test shows abnormal cells, your healthcare provider may be able to find and treat cervical problems right away, or stop cervical cancer before it becomes life-threatening. Pap tests can also diagnose serious infections and pelvic inflammation.   What other tests might I have along with this test?  You will likely have a pelvic exam along with this test. Depending on your age and other factors, your tissue samples may also be tested for human papillomavirus (HPV) infection at the same time your Pap test is done. Infection with some types of HPV puts you at risk for cervical cancer.  If you have an  abnormal Pap test result, your healthcare provider may order other tests. These may include:    Colposcopy. Your cervix and vagina are looked at with a microscope called a colposcope, which magnifies any abnormal areas.    Endocervical curettage. Cells are taken from the opening of your cervix with a spoon-shaped tool and looked at under a microscope. This may be done during the colposcopy.    Biopsy. A small tissue sample is taken from your cervix and looked at under a microscope. This may be done during the colposcopy.   What do my test results mean?  Test results may vary depending on your age, gender, health history, the method used for the test, and other things. Your test results may not mean you have a problem. Ask your healthcare provider what your test results mean for you.   Your results will either be normal or abnormal. If you get an abnormal result, this usually does not mean that you have cancer. It often means a minor cervical problem. Your healthcare provider may do another Pap test to confirm the initial results. Or he or she may recommend other tests such as colposcopy.  Occasionally a lab test has a false-positive result. This means you do not have a cervical problem even though the test result shows you do.   How is this test done?  This test is done with a sample of cervical cells. For the test, you lie on your back with your knees bent and your feet in stirrups, then relax and spread your legs. As part of a pelvic exam, your healthcare provider first checks your vagina and reproductive organs for infections and health problems.  Then your provider uses a device called a speculum to open the vagina. The provider examines your cervix and scrapes off a few cells from inside your cervix.  Some women may have slight discomfort when the speculum is inserted.  Does this test pose any risks?  This test poses no known risks.  What might affect my test results?  Using vaginal lubricants, cleansers,  contraceptives, or creams may mask your symptoms. Avoid using vaginal douches and abstain from sex for 2 days before an exam. Using these products or having sex may wash away or disguise abnormal cervical cells.  How do I get ready for this test?  It may seem like a good idea to wash up before having a Pap test, but this can actually erase the signs of a health problem. For accurate test results, avoid having sex or using tampons, douches, vaginal creams, deodorant sprays and powders, and contraceptive foams and jellies for 2 days before your exam.  Don't have the test while you're menstruating. The ideal time to have a Pap test is 10 to 20 days after the first day of your last period.   Be sure your healthcare provider knows about all medicines, herbs, vitamins, and supplements you are taking. This includes medicines that don't need a prescription and any illicit drugs you may use.     [id number] [employer]. [address] [place] [address], [place] [address]. All rights reserved. This information is not intended as a substitute for professional medical care. Always follow your healthcare professional's instructions.

## 2020-05-14 ENCOUNTER — VIRTUAL VISIT (OUTPATIENT)
Dept: PSYCHOLOGY | Facility: CLINIC | Age: 40
End: 2020-05-14
Payer: COMMERCIAL

## 2020-05-14 DIAGNOSIS — F41.1 GAD (GENERALIZED ANXIETY DISORDER): ICD-10-CM

## 2020-05-14 DIAGNOSIS — F33.2 SEVERE EPISODE OF RECURRENT MAJOR DEPRESSIVE DISORDER, WITHOUT PSYCHOTIC FEATURES (H): Primary | ICD-10-CM

## 2020-05-14 PROCEDURE — 90834 PSYTX W PT 45 MINUTES: CPT | Mod: GT | Performed by: SOCIAL WORKER

## 2020-05-14 ASSESSMENT — PATIENT HEALTH QUESTIONNAIRE - PHQ9
5. POOR APPETITE OR OVEREATING: MORE THAN HALF THE DAYS
SUM OF ALL RESPONSES TO PHQ QUESTIONS 1-9: 19

## 2020-05-14 ASSESSMENT — ANXIETY QUESTIONNAIRES
2. NOT BEING ABLE TO STOP OR CONTROL WORRYING: NEARLY EVERY DAY
5. BEING SO RESTLESS THAT IT IS HARD TO SIT STILL: SEVERAL DAYS
IF YOU CHECKED OFF ANY PROBLEMS ON THIS QUESTIONNAIRE, HOW DIFFICULT HAVE THESE PROBLEMS MADE IT FOR YOU TO DO YOUR WORK, TAKE CARE OF THINGS AT HOME, OR GET ALONG WITH OTHER PEOPLE: VERY DIFFICULT
1. FEELING NERVOUS, ANXIOUS, OR ON EDGE: NEARLY EVERY DAY
7. FEELING AFRAID AS IF SOMETHING AWFUL MIGHT HAPPEN: MORE THAN HALF THE DAYS
GAD7 TOTAL SCORE: 16
6. BECOMING EASILY ANNOYED OR IRRITABLE: NEARLY EVERY DAY
3. WORRYING TOO MUCH ABOUT DIFFERENT THINGS: MORE THAN HALF THE DAYS

## 2020-05-14 NOTE — PATIENT INSTRUCTIONS
Alexander Gibbons,    As we discussed today there are virtual groups running now to support folks with anxiety. [facility], the [employer] [place] [employer] runs multiple groups in [place]. I would encourage you to consider the [facility] Anxiety and Panic group. The times and contact information are listed on the 4th page of the document on this site.    [email or web address]    Have a good evening,  Madelin

## 2020-05-14 NOTE — PROGRESS NOTES
Progress Note    Patient Name: Werner Harrison  Date: 5/14/2020         Service Type: Individual      Session Start Time: 3:00pm  Session End Time: 3:50pm     Session Length: 50min    Session #: 5    Attendees: Client attended alone    Telemedicine Visit: The patient's condition can be safely assessed and treated via synchronous audio and visual telemedicine encounter.      Reason for Telemedicine Visit: Services only offered telehealth     Originating Site (Patient Location): Patient's home    Distant Site (Provider Location): Provider Remote Setting    Consent:  The patient/guardian has verbally consented to: the potential risks and benefits of telemedicine (video visit) versus in person care; bill my insurance or make self-payment for services provided; and responsibility for payment of non-covered services.     Mode of Communication:  Video Conference via [facility]    As the provider I attest to compliance with applicable laws and regulations related to telemedicine.           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    Homework: Achieved / completed to satisfaction      Episode of Care Goals: Minimal progress - PREPARATION (Decided to change - considering how); Intervened by negotiating a change plan and determining options / strategies for behavior change, identifying triggers, exploring social supports, and working towards setting a date to begin  behavior change     Current / Ongoing Stressors and Concerns:   Many demands at work, impact of COVID-19, trauma history     Treatment Objective(s) Addressed in This Session:      Patient will learn 2-4 coping skills to utilize when feeling stress, a strong emotion, or when needing to enhance safety.     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 Therapist provided containment and validation. Therapist explored with patient how she could identify what is in her control and how to take action on those items. Therapist supported patient to practice grounding through taking deep breathes and opening her chest to challenge tight feelings in her chest. Patient identified worries and fears about choices her extended family members are taking. Therapist supported patient to identify again things in her control.   Therapist encouraged patient to reach out if seeking a sooner appointment. Therapist provided information to patient about the possible help of medication, an anxiety group with [facility] or considering day treatment if these symptoms continue to drive her day to day experience.      ASSESSMENT: Current Emotional / Mental Status (status of significant symptoms):   Risk status (Self / Other harm or suicidal ideation)   Patient denies current fears or concerns for personal safety.   Patient denies current or recent suicidal ideation or behaviors.   Patient denies current or recent homicidal ideation or behaviors.   Patient denies current or recent self injurious behavior or ideation.   Patient denies other safety concerns.   Patient reports there has been no change in risk factors since their last session.     Patient reports there has been no change in protective factors since their last session.     A safety and risk management plan has been developed including: Patient consented to co-developed  safety plan.  Safety and risk management plan was completed.  Patient agreed to use safety plan should any safety concerns arise.  A copy was given to the patient.     Appearance:   Appropriate    Eye Contact:   Fair    Psychomotor Behavior: Normal    Attitude:   Cooperative    Orientation:   All   Speech    Rate / Production: Normal/ Responsive    Volume:  Normal    Mood:    Anxious  Fearful   Affect:    Worrisome    Thought Content:  Clear    Thought Form:  Coherent  Logical    Insight:    Fair      Medication Review:   No changes to current psychiatric medication(s)     Medication Compliance:   No Patient reports she wishes to try therapy before trying medication management for depression      Changes in Health Issues:   None reported     Chemical Use Review:   Substance Use: Chemical use reviewed, no active concerns identified      Tobacco Use: No current tobacco use.      Diagnosis:  1. Severe episode of recurrent major depressive disorder, without psychotic features (H)    2. LAURA (generalized anxiety disorder)        Collateral Reports Completed:   Not Applicable      PLAN: (Patient Tasks / Therapist Tasks / Other)  Patient to utilize deep breathing daily.  Patient to look into joining [employer] anxiety virtual group to build additional support.  Patient was encouraged to reach out for a sooner appointment if desired.        Madelin Vences, [facility]                                                         ______________________________________________________________________    Treatment Plan    Patient's Name: Werner Harrison  YOB: 1980    Date: 4/16/2020    DSM5 Diagnoses: 296.32 (F33.1) Major Depressive Disorder, Recurrent Episode, Moderate _ or 300.02 (F41.1) Generalized Anxiety Disorder  Psychosocial / Contextual Factors: Born in [place], grew up in [place], attended boarding school in [place] ages [age], trauma history, stress navigating cultural ways of communication, experience of  "\"identity crisis\" per patient report  WHODAS 2.0 Total Score 4/2/2020   Total Score 32         Referral / Collaboration:  Referral to another professional/service is not indicated at this time..    Anticipated number of session or this episode of care: 12-16      MeasurableTreatment Goal(s) related to diagnosis / functional impairment(s)  Goal 1: Patient will reduce experience of depression by half as measured by the PHQ9 from a score of 22 to 11 or less.    I will know I've met my goal when I'm not getting upset at home.      Objective #A     Patient will learn 2-4 facts about depression, anxiety, and how emotions manifest.  Status: New - Date: 4/16/2020     Intervention(s)  Therapist will provide psychoeducation and CBT.    Objective #B  Patient will learn 2-4 coping skills to utilize when feeling stress, a strong emotion, or when needing to enhance safety.  Status: New - Date: 4/16/2020     Intervention(s)  Therapist will utilize CBT, sensorimotor psychotherapy and DBT to teach coping skills and develop resources for stress.    Objective #C   Patient will learn 1-3 cognitive coping skills and skills to address beliefs about self.  Status: New - Date: 4/16/2020     Intervention(s)  Therapist will provide CBT and Sensorimotor psychotherapy to support patient's development of healthy thoughts and healthy beliefs about herself.    Objective #D  Patient will learn 1-3 skills to increase ease and confidence in communicating to others, and increase self-confidence.   Status: New - Date: 4/16/2020     Intervention(s)  Therapist will utilize CBT to support development of coping thoughts regarding social interactions as well as use role play to practice communication skills.      Patient has reviewed and agreed to the above plan.      Madelin Vences, [facility]  April 16, 2020  "

## 2020-05-15 ASSESSMENT — ANXIETY QUESTIONNAIRES: GAD7 TOTAL SCORE: 16

## 2020-06-11 ENCOUNTER — VIRTUAL VISIT (OUTPATIENT)
Dept: PSYCHOLOGY | Facility: CLINIC | Age: 40
End: 2020-06-11
Payer: COMMERCIAL

## 2020-06-11 DIAGNOSIS — F41.1 GAD (GENERALIZED ANXIETY DISORDER): ICD-10-CM

## 2020-06-11 DIAGNOSIS — F33.2 SEVERE EPISODE OF RECURRENT MAJOR DEPRESSIVE DISORDER, WITHOUT PSYCHOTIC FEATURES (H): Primary | ICD-10-CM

## 2020-06-11 PROCEDURE — 90834 PSYTX W PT 45 MINUTES: CPT | Mod: TEL | Performed by: SOCIAL WORKER

## 2020-06-11 NOTE — PROGRESS NOTES
"                                           Progress Note    Patient Name: Werner Harrison  Date: 6/11/2020         Service Type: Individual      Session Start Time: 3:02pm  Session End Time: 3:50pm     Session Length: 48min    Session #: 6    Attendees: Client attended alone    *Began visit with Wil. Switched to phone after 10 minutes due to poor connection.    The patient has been notified of the following:      \"We have found that certain health care needs can be provided without the need for a face to face visit.  This service lets us provide the care you need with a phone conversation.       I will have full access to your [facility] medical record during this entire phone call.   I will be taking notes for your medical record.      Since this is like an office visit, we will bill your insurance company for this service.       There are potential benefits and risks of telephone visits (e.g. limits to patient confidentiality) that differ from in-person visits.?  Confidentiality still applies for telephone services, and nobody will record the visit.  It is important to be in a quiet, private space that is free of distractions (including cell phone or other devices) during the visit.??      If during the course of the call I believe a telephone visit is not appropriate, you will not be charged for this service\"     Consent has been obtained for this service by care team member: Yes              Treatment Plan Last Reviewed: 4/16/2020  PHQ 2/14/2020 4/2/2020 5/14/2020   PHQ-9 Total Score 20 22 19   Q9: Thoughts of better off dead/self-harm past 2 weeks Several days Several days Several days     LAURA-7 SCORE 2/14/2020 4/2/2020 5/14/2020   Total Score 16 17 16         DATA  Interactive Complexity: No  Crisis: No       Progress Since Last Session (Related to Symptoms / Goals / Homework):   Symptoms: No change Patient reports stress, anger, frustration, and disappointment with how those around her have responded to " Shabbir Romo's death    Homework: Achieved / completed to satisfaction      Episode of Care Goals: Minimal progress - PREPARATION (Decided to change - considering how); Intervened by negotiating a change plan and determining options / strategies for behavior change, identifying triggers, exploring social supports, and working towards setting a date to begin behavior change     Current / Ongoing Stressors and Concerns:   Many demands at work, impact of COVID-19, trauma history, community response to Shabbir Demetrius's death     Treatment Objective(s) Addressed in This Session:    Patient will learn 1-3 cognitive coping skills and skills to address beliefs about self.   Patient will learn 2-4 coping skills to utilize when feeling stress, a strong emotion, or when needing to enhance safety.     Intervention:     CBT/Supportive therapy: Patient shared thoughts and feelings regarding Shabbir Romo's death and how others around her have responded. Therapist provided reflection and validation. Therapist supported patient to identify situations in which her feelings are justified and gave permission for feelings to occur.    Therapist and patient named the impact of racism in patient's personal life and in the community events. Patient shared her experiences of socialization regarding race and her beliefs about how the world should function. Therapist provided reflection of patient's values and beliefs.    Patient wished to learn additional skills for building groundedness in the coming weeks. Therapist practiced a mindfulness of an object exercise with patient.      ASSESSMENT: Current Emotional / Mental Status (status of significant symptoms):   Risk status (Self / Other harm or suicidal ideation)   Patient denies current fears or concerns for personal safety.   Patient denies current or recent suicidal ideation or behaviors.   Patient denies current or recent homicidal ideation or behaviors.   Patient denies current or recent  self injurious behavior or ideation.   Patient denies other safety concerns.   Patient reports there has been no change in risk factors since their last session.     Patient reports there has been no change in protective factors since their last session.     A safety and risk management plan has been developed including: Patient consented to co-developed safety plan.  Safety and risk management plan was completed.  Patient agreed to use safety plan should any safety concerns arise.  A copy was given to the patient.     Appearance:   Unable to assess    Eye Contact:   Unable to assess    Psychomotor Behavior: Unable to assess    Attitude:   Cooperative    Orientation:   All   Speech    Rate / Production: Normal/ Responsive    Volume:  Normal    Mood:    Irritable    Affect:    Unable to assess    Thought Content:  Clear    Thought Form:  Coherent  Logical    Insight:    Fair      Medication Review:   No changes to current psychiatric medication(s)     Medication Compliance:   No Patient reports she wishes to try therapy before trying medication management for depression      Changes in Health Issues:   None reported     Chemical Use Review:   Substance Use: Chemical use reviewed, no active concerns identified      Tobacco Use: No current tobacco use.      Diagnosis:  1. Severe episode of recurrent major depressive disorder, without psychotic features (H)    2. LAURA (generalized anxiety disorder)        Collateral Reports Completed:   Not Applicable      PLAN: (Patient Tasks / Therapist Tasks / Other)  Patient to consider when her feelings are justified.  Patient to practice grounding though mindfully observing an object when needed.        Madelin Vences, [facility]                                                         ______________________________________________________________________    Treatment Plan    Patient's Name: Werner Harrison  YOB: 1980    Date: 4/16/2020    DSM5 Diagnoses: 296.32  "(F33.1) Major Depressive Disorder, Recurrent Episode, Moderate _ or 300.02 (F41.1) Generalized Anxiety Disorder  Psychosocial / Contextual Factors: Born in [place], grew up in [place], attended boarding school in [facility] ages 11-18, trauma history, stress navigating cultural ways of communication, experience of \"identity crisis\" per patient report  WHODAS 2.0 Total Score 4/2/2020   Total Score 32         Referral / Collaboration:  Referral to another professional/service is not indicated at this time..    Anticipated number of session or this episode of care: 12-16      MeasurableTreatment Goal(s) related to diagnosis / functional impairment(s)  Goal 1: Patient will reduce experience of depression by half as measured by the PHQ9 from a score of 22 to 11 or less.    I will know I've met my goal when I'm not getting upset at home.      Objective #A     Patient will learn 2-4 facts about depression, anxiety, and how emotions manifest.  Status: New - Date: 4/16/2020     Intervention(s)  Therapist will provide psychoeducation and CBT.    Objective #B  Patient will learn 2-4 coping skills to utilize when feeling stress, a strong emotion, or when needing to enhance safety.  Status: New - Date: 4/16/2020     Intervention(s)  Therapist will utilize CBT, sensorimotor psychotherapy and DBT to teach coping skills and develop resources for stress.    Objective #C   Patient will learn 1-3 cognitive coping skills and skills to address beliefs about self.  Status: New - Date: 4/16/2020     Intervention(s)  Therapist will provide CBT and Sensorimotor psychotherapy to support patient's development of healthy thoughts and healthy beliefs about herself.    Objective #D  Patient will learn 1-3 skills to increase ease and confidence in communicating to others, and increase self-confidence.   Status: New - Date: 4/16/2020     Intervention(s)  Therapist will utilize CBT to support development of coping thoughts regarding social interactions as " well as use role play to practice communication skills.      Patient has reviewed and agreed to the above plan.      Madelin Vences, [facility]  April 16, 2020

## 2020-08-10 ENCOUNTER — TELEPHONE (OUTPATIENT)
Dept: PSYCHOLOGY | Facility: CLINIC | Age: 40
End: 2020-08-10

## 2020-09-08 ENCOUNTER — FCC EXTENDED DOCUMENTATION (OUTPATIENT)
Dept: PSYCHOLOGY | Facility: CLINIC | Age: 40
End: 2020-09-08

## 2020-09-08 NOTE — PROGRESS NOTES
"                    Discharge Summary  Multiple Sessions    Client Name: Werner Harrison MRN#: [id number] YOB: 1980      Intake / Discharge Date: Intake: 3/17/2020  Discharge: 9/8/2020      DSM5 Diagnoses: (Sustained by DSM5 Criteria Listed Above)  Diagnoses: 296.23 (F32.2) Major Depressive Disorder, Single Episode, Severe _  Psychosocial & Contextual Factors: Pandemic, trauma history  WHODAS 2.0 Total Score 4/2/2020   Total Score 32               Presenting Concern:  The reason for seeking services at this time is seeking support for depression symptoms.     Patient reports experiencing depression symptoms for the last 8 years and has attempted to manage on her own. Patient reports she noted in the last 3 months that her symptoms have been impacting her ability to function at work which has led her to seek services.     Patient reports feeling extremely down, depressed, and feeling constantly tired, and feels hopeless. Patient reports after going to work feeling so worn out and not wanting to do anything. Patient reports getting tearful most days, has difficulty falling asleep, has lost weight, and has difficulty focusing. Patient reports experiencing nightmares often, with themes of incidents from childhood. Patient reports feeling bitterness towards others and has a sense that she won't ever be able to \"catch a break.\" Patient reports she has no friends, and has chosen to not pursue friendships due to being hurt in previous friendships. Patient reports she has also isolated herself from functions with her 's family and has only attended major events such as [place] in the last three years.     Patient reports current passive suicidal thoughts of, \"what am I here for?\" and \"why am I being put through this?\" Patient reports also having feelings of worthlessness and uselessness, and experiencing thoughts of \"its not worth me being here.\" Patient denies considering methods of suicide in the " last 3 years.      Reason for Discharge:  Insurance: Provided [facility] Information and Provided information about [facility] eligibility and process      Disposition at Time of Last Encounter:   Comments:   Patient reported financial barriers to receiving counseling at this time and declined to schedule.     Risk Management:   Client has had a history of suicidal ideation: history of suicidal ideations and denies a history of suicide attempts, self-injurious behavior, homicidal ideation, homicidal behavior and and other safety concerns  A safety and risk management plan has been developed including: Patient consented to co-developed safety plan.  Safety and risk management plan was completed.  Patient agreed to use safety plan should any safety concerns arise.  A copy was given to the patient.      Referred To:  Therapist offered to support patient in accessing financial resources and/or lower cost therapy.      Madelin Vences, [facility]   9/8/2020

## 2020-09-17 ENCOUNTER — TELEPHONE (OUTPATIENT)
Dept: FAMILY MEDICINE | Facility: CLINIC | Age: 40
End: 2020-09-17

## 2020-09-17 NOTE — TELEPHONE ENCOUNTER
Reason for Call:  Form, our goal is to have forms completed with 72 hours, however, some forms may require a visit or additional information.    Type of letter, form or note:  FMLA    Who is the form from?: Patient    Where did the form come from: form was faxed in    What clinic location was the form placed at?: [facility]    Where the form was placed: Given to physician    Alexandra Regan MA

## 2020-09-18 NOTE — TELEPHONE ENCOUNTER
Will discuss these forms at appointment with patient on 9/28.  Put in my forms for appointment folder.  Dr. Yulissa Mcallister MD / [facility]

## 2020-09-28 ENCOUNTER — VIRTUAL VISIT (OUTPATIENT)
Dept: FAMILY MEDICINE | Facility: CLINIC | Age: 40
End: 2020-09-28
Payer: COMMERCIAL

## 2020-09-28 DIAGNOSIS — F41.1 GAD (GENERALIZED ANXIETY DISORDER): ICD-10-CM

## 2020-09-28 DIAGNOSIS — F41.0 PANIC ATTACK: ICD-10-CM

## 2020-09-28 DIAGNOSIS — Z00.00 PREVENTATIVE HEALTH CARE: ICD-10-CM

## 2020-09-28 DIAGNOSIS — F32.2 SEVERE MAJOR DEPRESSION (H): Primary | ICD-10-CM

## 2020-09-28 PROCEDURE — 96127 BRIEF EMOTIONAL/BEHAV ASSMT: CPT | Mod: 59 | Performed by: FAMILY MEDICINE

## 2020-09-28 PROCEDURE — 99214 OFFICE O/P EST MOD 30 MIN: CPT | Mod: 95 | Performed by: FAMILY MEDICINE

## 2020-09-28 ASSESSMENT — COLUMBIA-SUICIDE SEVERITY RATING SCALE - C-SSRS
1. WITHIN THE PAST MONTH, HAVE YOU WISHED YOU WERE DEAD OR WISHED YOU COULD GO TO SLEEP AND NOT WAKE UP?: NO
6. HAVE YOU EVER DONE ANYTHING, STARTED TO DO ANYTHING, OR PREPARED TO DO ANYTHING TO END YOUR LIFE?: NO
2. IN THE PAST MONTH, HAVE YOU ACTUALLY HAD ANY THOUGHTS OF KILLING YOURSELF?: NO

## 2020-09-28 ASSESSMENT — ANXIETY QUESTIONNAIRES
5. BEING SO RESTLESS THAT IT IS HARD TO SIT STILL: SEVERAL DAYS
GAD7 TOTAL SCORE: 19
7. FEELING AFRAID AS IF SOMETHING AWFUL MIGHT HAPPEN: NEARLY EVERY DAY
7. FEELING AFRAID AS IF SOMETHING AWFUL MIGHT HAPPEN: NEARLY EVERY DAY
GAD7 TOTAL SCORE: 19
1. FEELING NERVOUS, ANXIOUS, OR ON EDGE: NEARLY EVERY DAY
6. BECOMING EASILY ANNOYED OR IRRITABLE: NEARLY EVERY DAY
4. TROUBLE RELAXING: NEARLY EVERY DAY
GAD7 TOTAL SCORE: 19
2. NOT BEING ABLE TO STOP OR CONTROL WORRYING: NEARLY EVERY DAY
3. WORRYING TOO MUCH ABOUT DIFFERENT THINGS: NEARLY EVERY DAY

## 2020-09-28 ASSESSMENT — PATIENT HEALTH QUESTIONNAIRE - PHQ9
10. IF YOU CHECKED OFF ANY PROBLEMS, HOW DIFFICULT HAVE THESE PROBLEMS MADE IT FOR YOU TO DO YOUR WORK, TAKE CARE OF THINGS AT HOME, OR GET ALONG WITH OTHER PEOPLE: VERY DIFFICULT
SUM OF ALL RESPONSES TO PHQ QUESTIONS 1-9: 23
SUM OF ALL RESPONSES TO PHQ QUESTIONS 1-9: 23

## 2020-09-28 NOTE — PROGRESS NOTES
"Werner Harrison is a 40 year old female who is being evaluated via a billable video visit.      The patient has been notified of following:     \"This video visit will be conducted via a call between you and your physician/provider. We have found that certain health care needs can be provided without the need for an in-person physical exam.  This service lets us provide the care you need with a video conversation.  If a prescription is necessary we can send it directly to your pharmacy.  If lab work is needed we can place an order for that and you can then stop by our lab to have the test done at a later time.    Video visits are billed at different rates depending on your insurance coverage.  Please reach out to your insurance provider with any questions.    If during the course of the call the physician/provider feels a video visit is not appropriate, you will not be charged for this service.\"    Patient has given verbal consent for Video visit? Yes  How would you like to obtain your AVS? MyChart  If you are dropped from the video visit, the video invite should be resent to: Text to cell phone: [phone]  Will anyone else be joining your video visit? No    Subjective     Werner Harrison is a 40 year old female who presents today via video visit for the following health issues:    HPI      Pt is calling to discuss her forms.    Stated that things were going better.  Then  was furloughed, so couldn't afford continuing therapy.  So hasn't been working with her since July.   Things are okay - but in last month or so been very difficult.    Said would try medication if feeling worse.  Willing to try, but at same time gets free counseling through provider starting now.  Would like to try that free starting October.    Weekly.  Has to go once a week for an hour each.  With travel time too - 2 hours every 2 weeks.      Sometimes feels very anxious and overwhelmed.  Needs time to calm down - add language " that may need to take a longer break 30 minutes 3 times a week.      Denies suicidality.  Comes and goes.    Doesn't want to end life.  Has fear of it - doesn't want to hurt self.    Flu - will schedule for early OCtober.  Doesn't have ob/gyn.     PHQ 4/2/2020 5/14/2020 9/28/2020   PHQ-9 Total Score 22 19 23   Q9: Thoughts of better off dead/self-harm past 2 weeks Several days Several days Several days   F/U: Thoughts of suicide or self-harm - - No   F/U: Safety concerns - - No            Video Start Time: 2:56 pm        Review of Systems   Constitutional, HEENT, cardiovascular, pulmonary, gi and gu systems are negative, except as otherwise noted.      Objective    Vitals - Patient Reported  Height (Patient Reported): 152.4 cm (5')      Vitals:  No vitals were obtained today due to virtual visit.    Physical Exam     GENERAL: Healthy, alert and no distress  EYES: Eyes grossly normal to inspection.  No discharge or erythema, or obvious scleral/conjunctival abnormalities.  RESP: No audible wheeze, cough, or visible cyanosis.  No visible retractions or increased work of breathing.    SKIN: Visible skin clear. No significant rash, abnormal pigmentation or lesions.  NEURO: Cranial nerves grossly intact.  Mentation and speech appropriate for age.  PSYCH: Mentation appears normal, affect somewhat flat judgement and insight intact, normal speech and appearance well-groomed.              Assessment & Plan     Werner was seen today for forms.    Diagnoses and all orders for this visit:    Severe major depression (H)  LAURA (generalized anxiety disorder)  Panic attack    Comments:    Does not want to consider medication, denies SI and has had good improvement with therapy in past.    Will redo FMLA so pt can go to weekly therapy    Close f/u 1 month, sooner if worsening.    Also is due for complete physical exam - will call to get her on schedule    Offered medication, referral to psychiatrist or our mental health specialist,  patient declined now as has via work and has been helpful in past.  Will start going weekly.    Preventative health care  Comments:    Due for CPE and pap - we'll call to schedule.    She says will do flu shot in October with her  throug work           Depression Screening Follow Up    PHQ 2020   PHQ-9 Total Score 23   Q9: Thoughts of better off dead/self-harm past 2 weeks Several days   F/U: Thoughts of suicide or self-harm No   F/U: Safety concerns No           C-SSRS (Brief [place]) 2020   Within the last month, have you wished you were dead or wished you could go to sleep and not wake up? No   Within the last month, have you had any actual thoughts of killing yourself? No   Within the last month, have you ever done anything, started to do anything, or prepared to do anything to end your life? No         Follow Up  Follow Up Actions Taken  Crisis resource information provided in the After Visit Summary  Referred patient back to mental health provider    Discussed the following ways the patient can remain in a safe environment:  remove things I could use to hurt myself: that are dangerous and be around others      Patient Instructions   Emergency Psychiatric Services in the [facility] (updated 2020)    1. COPE: [facility]'s 24 hour Mental Health Crisis Line: [phone]    2. Walk In [phone]    3. Mental Health Emergency Room at [facility] ER and [facility] ER - called the B.E.C. (Behavioral Emergency Center).  Walk in anytime. Our Behavioral Emergency Center ([facility]) is open  for anyone in crisis, for assessment, evaluation, and care: [phone].    4. Crisis Text Line: www.crisistextline.org  Text 613-105 from anywhere in the [place], anytime, about any type of crisis.   A live, trained Crisis Counselor receives the text and responds quickly.   The volunteer Crisis Counselor will help you move from a 'hot moment to a cool moment'        5. Local Crisis Lines by County:    [facility] [phone]                                                [facility] [phone]                                                                  [facility] [phone]    [facility] [phone]      [facility] [phone]    [facility] [phone]          National Suicide Prevention [phone]          Return in about 1 month (around 10/28/2020) for Preventative Annual Visit.    Yulissa Mcallister MD  [facility]      Video-Visit Details    Type of service:  Video Visit    Video End Time:3:08 PM    Originating Location (pt. Location): Home    Distant Location (provider location):  [facility]     Platform used for Video Visit: Blake

## 2020-09-28 NOTE — PATIENT INSTRUCTIONS
Emergency Psychiatric Services in the [facility] (updated 2020)    1. COPE: [facility]'s 24 hour Mental Health Crisis Line: [phone]    2. Walk In [phone]    3. Mental Health Emergency Room at [facility] ER and [facility] ER - called the B.E.C. ([facility]al Emergency Center).  Walk in anytime. Our Behavioral Emergency Center ([facility]) is open  for anyone in crisis, for assessment, evaluation, and care: [phone].    4. Crisis Text Line: www.crisistextline.org  Text 741-731 from anywhere in the [place], anytime, about any type of crisis.   A live, trained Crisis Counselor receives the text and responds quickly.   The volunteer Crisis Counselor will help you move from a 'hot moment to a cool moment'       5. Local Crisis Lines by County:    [facility] [phone]                                                [facility] [phone]                                                                  [facility] [phone]    [facility] [phone]      [facility] [phone]    [facility] [phone]          National Suicide Prevention [phone]

## 2020-09-29 ASSESSMENT — PATIENT HEALTH QUESTIONNAIRE - PHQ9: SUM OF ALL RESPONSES TO PHQ QUESTIONS 1-9: 23

## 2020-09-29 ASSESSMENT — ANXIETY QUESTIONNAIRES: GAD7 TOTAL SCORE: 19

## 2020-11-03 ENCOUNTER — MYC MEDICAL ADVICE (OUTPATIENT)
Dept: FAMILY MEDICINE | Facility: CLINIC | Age: 40
End: 2020-11-03

## 2020-12-04 ENCOUNTER — VIRTUAL VISIT (OUTPATIENT)
Dept: FAMILY MEDICINE | Facility: CLINIC | Age: 40
End: 2020-12-04
Payer: COMMERCIAL

## 2020-12-04 DIAGNOSIS — H60.541 ACUTE ECZEMATOID OTITIS EXTERNA OF RIGHT EAR: Primary | ICD-10-CM

## 2020-12-04 PROCEDURE — 99213 OFFICE O/P EST LOW 20 MIN: CPT | Mod: 95 | Performed by: FAMILY MEDICINE

## 2020-12-04 RX ORDER — NEOMYCIN SULFATE, POLYMYXIN B SULFATE AND HYDROCORTISONE 10; 3.5; 1 MG/ML; MG/ML; [USP'U]/ML
4 SUSPENSION/ DROPS AURICULAR (OTIC) 4 TIMES DAILY
Qty: 10 ML | Refills: 0 | Status: SHIPPED | OUTPATIENT
Start: 2020-12-04 | End: 2020-12-09

## 2020-12-04 NOTE — PATIENT INSTRUCTIONS
Patient Education     External Ear Infection (Adult)    External otitis (also called  swimmer s ear ) is an infection in the ear canal. It is often caused by bacteria or fungus. It can occur a few days after water gets trapped in the ear canal (from swimming or bathing). It can also occur after cleaning too deeply in the ear canal with a cotton swab or other object. Sometimes, hair care products get into the ear canal and cause this problem.  Symptoms can include pain, fever, itching, redness, drainage, or swelling of the ear canal. Temporary hearing loss may also occur.  Home care    Do not try to clean the ear canal. This can push pus and bacteria deeper into the canal.    Use prescribed ear drops as directed. These help reduce swelling and fight the infection. If an ear wick was placed in the ear canal, apply drops right onto the end of the wick. The wick will draw the medicine into the ear canal even if it is swollen closed.    A cotton ball may be loosely placed in the outer ear to absorb any drainage.    You may use acetaminophen or ibuprofen to control pain, unless another medicine was prescribed. Note: If you have chronic liver or kidney disease or ever had a stomach ulcer or GI bleeding, talk to your healthcare provider before taking any of these medicines.    Do not allow water to get into your ear when bathing. Also, don't swim until the infection has cleared.  Prevention    Keep your ears dry. This helps lower the risk of infection. Dry your ears with a towel or hair dryer after getting wet. Also, use ear plugs when swimming.    Do not stick any objects in the ear to remove wax.    If you feel water trapped in your ear, use ear drops right away. You can get these drops over the counter at most drugstores. They work by removing water from the ear canal.  Follow-up care  Follow up with your healthcare provider in 1 week, or as advised.  When to seek medical advice  Call your healthcare provider right  away if any of these occur:    Ear pain becomes worse or doesn t improve after 3 days of treatment    Redness or swelling of the outer ear occurs or gets worse    Headache    Painful or stiff neck    Drowsiness or confusion    Fever of 100.4 F (38 C) or higher, or as directed by your healthcare provider    Genie Mccoy last reviewed this educational content on 10/1/2017    [phone] [employer]. [address] [place] [address]. All rights reserved. This information is not intended as a substitute for professional medical care. Always follow your healthcare professional's instructions.

## 2020-12-04 NOTE — PROGRESS NOTES
"Werner Harrison is a 40 year old female who is being evaluated via a billable video visit.      The patient has been notified of following:     \"This video visit will be conducted via a call between you and your physician/provider. We have found that certain health care needs can be provided without the need for an in-person physical exam.  This service lets us provide the care you need with a video conversation.  If a prescription is necessary we can send it directly to your pharmacy.  If lab work is needed we can place an order for that and you can then stop by our lab to have the test done at a later time.    Video visits are billed at different rates depending on your insurance coverage.  Please reach out to your insurance provider with any questions.    If during the course of the call the physician/provider feels a video visit is not appropriate, you will not be charged for this service.\"    Patient has given verbal consent for Video visit? Yes  How would you like to obtain your AVS? MyChart  If you are dropped from the video visit, the video invite should be resent to: Send to e-mail at: [email or web address]  Will anyone else be joining your video visit? No    Subjective     Werner Harrison is a 40 year old female who presents today via video visit for the following health issues:    HPI     Concern - Ear Problem  Onset: x 1.5 months  Description: right ear discharge  Intensity: severe  Progression of Symptoms:  worsening  Accompanying Signs & Symptoms: discharge (thin liquid), swelling, itchy due to eczema   Previous history of similar problem: Has had eczema on neck before and after scratching same kind of problem  Precipitating factors:        Worsened by: wind  Alleviating factors:        Improved by: None  Therapies tried and outcome: vaseline which somewhat helps    Thinks it's infected.  Feels hot.  Has drainage.  No drops.  Has put vaseline around ear.  Not painful.  No fever.       Video " "Start Time: 3:20 PM        Review of Systems   Constitutional, HEENT, cardiovascular, pulmonary, gi and gu systems are negative, except as otherwise noted.      Objective    Vitals - Patient Reported  Weight (Patient Reported): 38.6 kg (85 lb)  Height (Patient Reported): 150 cm (4' 11.06\")  BMI (Based on Pt Reported Ht/Wt): 17.14      Vitals:  No vitals were obtained today due to virtual visit.    Physical Exam     GENERAL: Healthy, alert and no distress  EYES: Eyes grossly normal to inspection.  No discharge or erythema, or obvious scleral/conjunctival abnormalities.  RESP: No audible wheeze, cough, or visible cyanosis.  No visible retractions or increased work of breathing.    SKIN: Visible skin clear. No significant rash, abnormal pigmentation or lesions.  NEURO: Cranial nerves grossly intact.  Mentation and speech appropriate for age.  PSYCH: Mentation appears normal, affect normal/bright, judgement and insight intact, normal speech and appearance well-groomed.          Assessment & Plan     Werner was seen today for ear problem.    Diagnoses and all orders for this visit:    Acute eczematoid otitis externa of right ear  -     neomycin-polymyxin-hydrocortisone (CORTISPORIN) 3.5-66590-9 otic suspension; Place 4 drops into the right ear 4 times daily for 5 days      Chronic with acute flare.    Use drops 4 times daily x 5 days    Let us know if doesn't improve or worsens.        Patient Instructions     Patient Education     External Ear Infection (Adult)    External otitis (also called  swimmer s ear ) is an infection in the ear canal. It is often caused by bacteria or fungus. It can occur a few days after water gets trapped in the ear canal (from swimming or bathing). It can also occur after cleaning too deeply in the ear canal with a cotton swab or other object. Sometimes, hair care products get into the ear canal and cause this problem.  Symptoms can include pain, fever, itching, redness, drainage, or " swelling of the ear canal. Temporary hearing loss may also occur.  Home care    Do not try to clean the ear canal. This can push pus and bacteria deeper into the canal.    Use prescribed ear drops as directed. These help reduce swelling and fight the infection. If an ear wick was placed in the ear canal, apply drops right onto the end of the wick. The wick will draw the medicine into the ear canal even if it is swollen closed.    A cotton ball may be loosely placed in the outer ear to absorb any drainage.    You may use acetaminophen or ibuprofen to control pain, unless another medicine was prescribed. Note: If you have chronic liver or kidney disease or ever had a stomach ulcer or GI bleeding, talk to your healthcare provider before taking any of these medicines.    Do not allow water to get into your ear when bathing. Also, don't swim until the infection has cleared.  Prevention    Keep your ears dry. This helps lower the risk of infection. Dry your ears with a towel or hair dryer after getting wet. Also, use ear plugs when swimming.    Do not stick any objects in the ear to remove wax.    If you feel water trapped in your ear, use ear drops right away. You can get these drops over the counter at most drugstores. They work by removing water from the ear canal.  Follow-up care  Follow up with your healthcare provider in 1 week, or as advised.  When to seek medical advice  Call your healthcare provider right away if any of these occur:    Ear pain becomes worse or doesn t improve after 3 days of treatment    Redness or swelling of the outer ear occurs or gets worse    Headache    Painful or stiff neck    Drowsiness or confusion    Fever of 100.4 F (38 C) or higher, or as directed by your healthcare provider    Genie Mccoy last reviewed this educational content on 10/1/2017    5506-0830 [employer]. [address] [place] [address], [place] [address]. All rights reserved. This information is not intended as  a substitute for professional medical care. Always follow your healthcare professional's instructions.               No follow-ups on file.    Yulissa Mcallister MD  [facility]      Video-Visit Details    Type of service:  Video Visit    Video End Time:3:24 PM    Originating Location (pt. Location): Home    Distant Location (provider location):  [facility]     Platform used for Video Visit: [employer]

## 2021-01-14 ENCOUNTER — MYC MEDICAL ADVICE (OUTPATIENT)
Dept: FAMILY MEDICINE | Facility: CLINIC | Age: 41
End: 2021-01-14

## 2021-01-14 DIAGNOSIS — H60.541 ACUTE ECZEMATOID OTITIS EXTERNA OF RIGHT EAR: Primary | ICD-10-CM

## 2021-01-14 RX ORDER — CIPROFLOXACIN AND DEXAMETHASONE 3; 1 MG/ML; MG/ML
4 SUSPENSION/ DROPS AURICULAR (OTIC) 2 TIMES DAILY
Qty: 7.5 ML | Refills: 0 | Status: SHIPPED | OUTPATIENT
Start: 2021-01-14 | End: 2024-06-20

## 2021-01-14 NOTE — TELEPHONE ENCOUNTER
To covering provider,    PLease see pt's messages and advise on next step     Acute eczematoid otitis externa of right ear on 12/4/20 , relief with drops but keeps recurring    Referral or virtual appt or cont on cortisporin drops?    Genny Christian, RN, BSN

## 2021-01-14 NOTE — TELEPHONE ENCOUNTER
Sent in course of Ciprodex.   Should schedule follow up if not improving.   Would they be interested in ENT referral?  Thanks

## 2021-01-14 NOTE — TELEPHONE ENCOUNTER
Writer responded via [employer].  STEFFANIE CheemaN, RN  [facility]

## 2021-04-18 ENCOUNTER — HEALTH MAINTENANCE LETTER (OUTPATIENT)
Age: 41
End: 2021-04-18

## 2021-10-03 ENCOUNTER — HEALTH MAINTENANCE LETTER (OUTPATIENT)
Age: 41
End: 2021-10-03

## 2021-10-19 PROBLEM — F32.9 MAJOR DEPRESSION: Status: ACTIVE | Noted: 2020-09-28

## 2022-05-15 ENCOUNTER — HEALTH MAINTENANCE LETTER (OUTPATIENT)
Age: 42
End: 2022-05-15

## 2022-09-10 ENCOUNTER — HEALTH MAINTENANCE LETTER (OUTPATIENT)
Age: 42
End: 2022-09-10

## 2022-09-20 NOTE — PROGRESS NOTES
Problem: Fall Injury Risk  Goal: Absence of Fall and Fall-Related Injury  Outcome: Ongoing, Progressing     Problem: Skin Injury Risk Increased  Goal: Skin Health and Integrity  Outcome: Ongoing, Progressing     Problem: Adult Inpatient Plan of Care  Goal: Patient-Specific Goal (Individualized)  Outcome: Ongoing, Progressing      "Tried calling cell phone \"voice mail has not been set up yet.\"  Sent MyChart msg.Flor Echevarria, NA/R    "

## 2022-09-26 ENCOUNTER — MYC MEDICAL ADVICE (OUTPATIENT)
Dept: FAMILY MEDICINE | Facility: CLINIC | Age: 42
End: 2022-09-26

## 2022-09-28 NOTE — TELEPHONE ENCOUNTER
Writer responded via [employer].    STEFFANIE CheemaN, RN-BC  [facility]

## 2023-06-03 ENCOUNTER — HEALTH MAINTENANCE LETTER (OUTPATIENT)
Age: 43
End: 2023-06-03

## 2024-02-18 ENCOUNTER — HEALTH MAINTENANCE LETTER (OUTPATIENT)
Age: 44
End: 2024-02-18

## 2024-06-15 SDOH — HEALTH STABILITY: PHYSICAL HEALTH: ON AVERAGE, HOW MANY DAYS PER WEEK DO YOU ENGAGE IN MODERATE TO STRENUOUS EXERCISE (LIKE A BRISK WALK)?: 0 DAYS

## 2024-06-15 SDOH — HEALTH STABILITY: PHYSICAL HEALTH: ON AVERAGE, HOW MANY MINUTES DO YOU ENGAGE IN EXERCISE AT THIS LEVEL?: 0 MIN

## 2024-06-15 ASSESSMENT — SOCIAL DETERMINANTS OF HEALTH (SDOH): HOW OFTEN DO YOU GET TOGETHER WITH FRIENDS OR RELATIVES?: NEVER

## 2024-06-19 ASSESSMENT — PATIENT HEALTH QUESTIONNAIRE - PHQ9
10. IF YOU CHECKED OFF ANY PROBLEMS, HOW DIFFICULT HAVE THESE PROBLEMS MADE IT FOR YOU TO DO YOUR WORK, TAKE CARE OF THINGS AT HOME, OR GET ALONG WITH OTHER PEOPLE: SOMEWHAT DIFFICULT
SUM OF ALL RESPONSES TO PHQ QUESTIONS 1-9: 7
SUM OF ALL RESPONSES TO PHQ QUESTIONS 1-9: 7

## 2024-06-20 ENCOUNTER — OFFICE VISIT (OUTPATIENT)
Dept: FAMILY MEDICINE | Facility: CLINIC | Age: 44
End: 2024-06-20
Payer: MEDICAID

## 2024-06-20 VITALS
SYSTOLIC BLOOD PRESSURE: 113 MMHG | BODY MASS INDEX: 13.27 KG/M2 | TEMPERATURE: 97.2 F | OXYGEN SATURATION: 100 % | RESPIRATION RATE: 18 BRPM | HEART RATE: 74 BPM | WEIGHT: 70.3 LBS | HEIGHT: 61 IN | DIASTOLIC BLOOD PRESSURE: 78 MMHG

## 2024-06-20 DIAGNOSIS — Z11.4 SCREENING FOR HIV (HUMAN IMMUNODEFICIENCY VIRUS): ICD-10-CM

## 2024-06-20 DIAGNOSIS — R63.4 WEIGHT LOSS: ICD-10-CM

## 2024-06-20 DIAGNOSIS — Z12.31 VISIT FOR SCREENING MAMMOGRAM: ICD-10-CM

## 2024-06-20 DIAGNOSIS — Z12.4 CERVICAL CANCER SCREENING: ICD-10-CM

## 2024-06-20 DIAGNOSIS — Z11.59 NEED FOR HEPATITIS C SCREENING TEST: ICD-10-CM

## 2024-06-20 DIAGNOSIS — Z00.00 ROUTINE GENERAL MEDICAL EXAMINATION AT A HEALTH CARE FACILITY: Primary | ICD-10-CM

## 2024-06-20 DIAGNOSIS — E55.9 VITAMIN D DEFICIENCY: ICD-10-CM

## 2024-06-20 PROBLEM — F33.42 MAJOR DEPRESSIVE DISORDER, RECURRENT EPISODE, IN FULL REMISSION (H): Status: ACTIVE | Noted: 2020-09-28

## 2024-06-20 LAB
ALBUMIN SERPL BCG-MCNC: 4.9 G/DL (ref 3.5–5.2)
ALP SERPL-CCNC: 53 U/L (ref 40–150)
ALT SERPL W P-5'-P-CCNC: 15 U/L (ref 0–50)
ANION GAP SERPL CALCULATED.3IONS-SCNC: 14 MMOL/L (ref 7–15)
AST SERPL W P-5'-P-CCNC: 31 U/L (ref 0–45)
BILIRUB SERPL-MCNC: 0.8 MG/DL
BUN SERPL-MCNC: 7.7 MG/DL (ref 6–20)
CALCIUM SERPL-MCNC: 10.3 MG/DL (ref 8.6–10)
CHLORIDE SERPL-SCNC: 87 MMOL/L (ref 98–107)
CHOLEST SERPL-MCNC: 265 MG/DL
CREAT SERPL-MCNC: 0.76 MG/DL (ref 0.51–0.95)
DEPRECATED HCO3 PLAS-SCNC: 33 MMOL/L (ref 22–29)
EGFRCR SERPLBLD CKD-EPI 2021: >90 ML/MIN/1.73M2
ERYTHROCYTE [DISTWIDTH] IN BLOOD BY AUTOMATED COUNT: 12.4 % (ref 10–15)
FASTING STATUS PATIENT QL REPORTED: YES
FASTING STATUS PATIENT QL REPORTED: YES
GLUCOSE SERPL-MCNC: 85 MG/DL (ref 70–99)
HBA1C MFR BLD: 5.8 % (ref 0–5.6)
HCT VFR BLD AUTO: 40.3 % (ref 35–47)
HCV AB SERPL QL IA: NONREACTIVE
HDLC SERPL-MCNC: 111 MG/DL
HGB BLD-MCNC: 14 G/DL (ref 11.7–15.7)
HIV 1+2 AB+HIV1 P24 AG SERPL QL IA: NONREACTIVE
LDLC SERPL CALC-MCNC: 124 MG/DL
MCH RBC QN AUTO: 30.4 PG (ref 26.5–33)
MCHC RBC AUTO-ENTMCNC: 34.7 G/DL (ref 31.5–36.5)
MCV RBC AUTO: 87 FL (ref 78–100)
NONHDLC SERPL-MCNC: 154 MG/DL
PLATELET # BLD AUTO: 210 10E3/UL (ref 150–450)
POTASSIUM SERPL-SCNC: 3.7 MMOL/L (ref 3.4–5.3)
PROT SERPL-MCNC: 7.8 G/DL (ref 6.4–8.3)
RBC # BLD AUTO: 4.61 10E6/UL (ref 3.8–5.2)
SODIUM SERPL-SCNC: 134 MMOL/L (ref 135–145)
TRIGL SERPL-MCNC: 150 MG/DL
TSH SERPL DL<=0.005 MIU/L-ACNC: 2.15 UIU/ML (ref 0.3–4.2)
VIT D+METAB SERPL-MCNC: 42 NG/ML (ref 20–50)
WBC # BLD AUTO: 7.2 10E3/UL (ref 4–11)

## 2024-06-20 PROCEDURE — 99386 PREV VISIT NEW AGE 40-64: CPT | Performed by: PHYSICIAN ASSISTANT

## 2024-06-20 PROCEDURE — 84443 ASSAY THYROID STIM HORMONE: CPT | Performed by: PHYSICIAN ASSISTANT

## 2024-06-20 PROCEDURE — 80061 LIPID PANEL: CPT | Performed by: PHYSICIAN ASSISTANT

## 2024-06-20 PROCEDURE — 86803 HEPATITIS C AB TEST: CPT | Performed by: PHYSICIAN ASSISTANT

## 2024-06-20 PROCEDURE — 80053 COMPREHEN METABOLIC PANEL: CPT | Performed by: PHYSICIAN ASSISTANT

## 2024-06-20 PROCEDURE — 36415 COLL VENOUS BLD VENIPUNCTURE: CPT | Performed by: PHYSICIAN ASSISTANT

## 2024-06-20 PROCEDURE — 83036 HEMOGLOBIN GLYCOSYLATED A1C: CPT | Performed by: PHYSICIAN ASSISTANT

## 2024-06-20 PROCEDURE — 87389 HIV-1 AG W/HIV-1&-2 AB AG IA: CPT | Performed by: PHYSICIAN ASSISTANT

## 2024-06-20 PROCEDURE — 85027 COMPLETE CBC AUTOMATED: CPT | Performed by: PHYSICIAN ASSISTANT

## 2024-06-20 PROCEDURE — 82306 VITAMIN D 25 HYDROXY: CPT | Performed by: PHYSICIAN ASSISTANT

## 2024-06-20 ASSESSMENT — PAIN SCALES - GENERAL: PAINLEVEL: NO PAIN (0)

## 2024-06-20 NOTE — PATIENT INSTRUCTIONS
"Patient Education   Preventive Care Advice   This is general advice we often give to help people stay healthy. Your care team may have specific advice just for you. Please talk to your care team about your own preventive care needs.  Lifestyle  Exercise at least 150 minutes each week (30 minutes a day, 5 days a week).  Do muscle strengthening activities 2 days a week. These help control your weight and prevent disease.  No smoking.  Wear sunscreen to prevent skin cancer.  Have your home tested for radon every 2 to 5 years. Radon is a colorless, odorless gas that can harm your lungs. To learn more, go to www.health.[facility].mn.us and search for \"Radon in Homes.\"  Keep guns unloaded and locked up in a safe place like a safe or gun vault, or, use a gun lock and hide the keys. Always lock away bullets separately. To learn more, visit [employer].gov and search for \"safe gun storage.\"  Nutrition  Eat 5 or more servings of fruits and vegetables each day.  Try wheat bread, brown rice and whole grain pasta (instead of white bread, rice, and pasta).  Get enough calcium and vitamin D. Check the label on foods and aim for 100% of the RDA (recommended daily allowance).  Regular exams  Have a dental exam and cleaning every 6 months.  See your health care team every year to talk about:  Any changes in your health.  Any medicines your care team has prescribed.  Preventive care, family planning, and ways to prevent chronic diseases.  Shots (vaccines)   HPV shots (up to age 26), if you've never had them before.  Hepatitis B shots (up to age 59), if you've never had them before.  COVID-19 shot: Get this shot when it's due.  Flu shot: Get a flu shot every year.  Tetanus shot: Get a tetanus shot every 10 years.  Pneumococcal, hepatitis A, and RSV shots: Ask your care team if you need these based on your risk.  Shingles shot (for age 50 and up).  General health tests  Diabetes screening:  Starting at age 35, Get screened for diabetes at least " I called this lady back on June 16, 2018 about this Holter monitor study.  ES every 3 years.  If you are younger than age 35, ask your care team if you should be screened for diabetes.  Cholesterol test: At age 39, start having a cholesterol test every 5 years, or more often if advised.  Bone density scan (DEXA): At age 50, ask your care team if you should have this scan for osteoporosis (brittle bones).  Hepatitis C: Get tested at least once in your life.  Abdominal aortic aneurysm screening: Talk to your doctor about having this screening if you:  Have ever smoked; and  Are biologically male; and  Are between the ages of 65 and 75.  STIs (sexually transmitted infections)  Before age 24: Ask your care team if you should be screened for STIs.  After age 24: Get screened for STIs if you're at risk. You are at risk for STIs (including HIV) if:  You are sexually active with more than one person.  You don't use condoms every time.  You or a partner was diagnosed with a sexually transmitted infection.  If you are at risk for HIV, ask about PrEP medicine to prevent HIV.  Get tested for HIV at least once in your life, whether you are at risk for HIV or not.  Cancer screening tests  Cervical cancer screening: If you have a cervix, begin getting regular cervical cancer screening tests at age 21. Most people who have regular screenings with normal results can stop after age 65. Talk about this with your provider.  Breast cancer scan (mammogram): If you've ever had breasts, begin having regular mammograms starting at age 40. This is a scan to check for breast cancer.  Colon cancer screening: It is important to start screening for colon cancer at age 45.  Have a colonoscopy test every 10 years (or more often if you're at risk) Or, ask your provider about stool tests like a FIT test every year or Cologuard test every 3 years.  To learn more about your testing options, visit: www.[employer]/531152.pdf.  For help making a decision, visit: jose/gm27660.  Prostate cancer screening test: If you have a  prostate and are age 55 to 69, ask your provider if you would benefit from a yearly prostate cancer screening test.  Lung cancer screening: If you are a current or former smoker age 50 to 80, ask your care team if ongoing lung cancer screenings are right for you.  For informational purposes only. Not to replace the advice of your health care provider. Copyright   2023 [employer]. All rights reserved. Clinically reviewed by the [facility] Transitions Program. [employer] 761924 - REV 04/24.  Relationships for Good Health  Relationships are important for our health and happiness. Social isolation, loneliness and lack of support are bad for your health. Studies show that loneliness can harm health and limit your life span as much as high blood pressure and smoking.   Take some time to reflect on your relationships. Then answer these questions:  Are there people in your life that cause you stress or drain your energy? What can you do to set limits?  ________________________________________________________________________________________________________________________________________________________________________________________________________________________________________________________________________________________________________________________________________________  Who do you enjoy spending time with? Who can you go to for support?  ________________________________________________________________________________________________________________________________________________________________________________________________________________________________________________________________________________________________________________________________________________  What can you do to improve your relationships with  others?  __________________________________________________________________________________________________________________________________________________________________________________________________________________  ______________________________________________________________________________________________________________________________  What do you like most about your relationships with others?  ________________________________________________________________________________________________________________________________________________________________________________________________________________________________________________________________________________________________________________________________________________  My goal: ______________________________________________________________________  I will ______________________________________________________________________________________________________________________________________________________________________________________________    For informational purposes only. Not to replace the advice of your health care provider. Copyright   2018 [facility]. All rights reserved. Clinically reviewed by Bariatric Health  Team. [employer] 429538 - Rev 04/21.    Learning About Depression Screening  What is depression screening?  Depression screening is a way to see if you have depression symptoms. It may be done by a doctor or counselor. It's often part of a routine checkup. That's because your mental health is just as important as your physical health.  Depression is a mental health condition that affects how you feel, think, and act. You may:  Have less energy.  Lose interest in your daily activities.  Feel sad and grouchy for a long time.  Depression is very common. It affects people of all ages.  Many things can lead to depression. Some people become depressed after they have a stroke or find out they have a major illness  "like cancer or heart disease. The death of a loved one or a breakup may lead to depression. It can run in families. Most experts believe that a combination of inherited genes and stressful life events can cause it.  What happens during screening?  You may be asked to fill out a form about your depression symptoms. You and the doctor will discuss your answers. The doctor may ask you more questions to learn more about how you think, act, and feel.  What happens after screening?  If you have symptoms of depression, your doctor will talk to you about your options.  Doctors usually treat depression with medicines or counseling. Often, combining the two works best. Many people don't get help because they think that they'll get over the depression on their own. But people with depression may not get better unless they get treatment.  The cause of depression is not well understood. There may be many factors involved. But if you have depression, it's not your fault.  A serious symptom of depression is thinking about death or suicide. If you or someone you care about talks about this or about feeling hopeless, get help right away.  It's important to know that depression can be treated. Medicine, counseling, and self-care may help.  Where can you learn more?  Go to https://www.[employer].net/patiented  Enter T185 in the search box to learn more about \"Learning About Depression Screening.\"  Current as of: June 24, 2023               Content Version: 14.0    7221-8958 [employer].   Care instructions adapted under license by your healthcare professional. If you have questions about a medical condition or this instruction, always ask your healthcare professional. [employer] disclaims any warranty or liability for your use of this information.         "

## 2024-06-20 NOTE — PROGRESS NOTES
Preventive Care Visit  [facility]  Deion Srivastava PA-C, Physician Assistant  Jun 20, 2024      Assessment & Plan     (Z00.00) Routine general medical examination at a health care facility  (primary encounter diagnosis)  Comment:   Plan: Lipid panel reflex to direct LDL Non-fasting        Patient declined pap today, declined further evaluation for irregular menstrual cycle, thought to be due to low BMI.  Patient is hesitant to perform mammogram, today we discussed at length the benefit and risk of screening.  She will reconsider in the near future.  Advised flu and COVID boosters this fall    (Z12.31) Visit for screening mammogram  Comment:   Plan: MA Screening Bilateral w/ Skyler          (Z11.4) Screening for HIV (human immunodeficiency virus)  Comment:   Plan: HIV Antigen Antibody Combo            (Z11.59) Need for hepatitis C screening test  Comment:   Plan: Hepatitis C Screen Reflex to HCV RNA Quant and         Genotype            (Z12.4) Cervical cancer screening  Comment:   Plan: See above, offered referral to female provider or gynecology, patient declined at this time.  No previous Pap performed, discussed at length the benefit of cervical cancer screening    (E55.9) Vitamin D deficiency  Comment:   Plan: Vitamin D Deficiency        No current supplementation    (R63.4) Weight loss  Comment:   Plan: CBC with platelets, Comprehensive metabolic         panel (BMP + Alb, Alk Phos, ALT, AST, Total.         Bili, TP), TSH with free T4 reflex, Hemoglobin         A1c        Further workup of weight loss, consider mental health as a comorbidity leading to decreased appetite.  Strong family history of diabetes therefore A1C added on.    Patient has been advised of split billing requirements and indicates understanding: Yes        Counseling  Appropriate preventive services were discussed with this patient, including applicable screening as appropriate for fall prevention, nutrition,  physical activity, Tobacco-use cessation, weight loss and cognition.  Checklist reviewing preventive services available has been given to the patient.  Reviewed patient's diet, addressing concerns and/or questions.   Patient is at risk for social isolation and has been provided with information about the benefit of social connection.   She is at risk for psychosocial distress and has been provided with information to reduce risk.   The patient's PHQ-9 score is consistent with mild depression. She was provided with information regarding depression.           Andi Gibbons is a 44 year old, presenting for the following:  Physical        6/20/2024     7:21 AM   Additional Questions   Roomed by Laith MARTINEZ   Accompanied by self        Health Care Directive  Patient does not have a Health Care Directive or Living Will: Discussed advance care planning with patient; information given to patient to review.    HPI    Pt with no acute concerns.    She has lost weight over the past 3 years but reports that she has always been petite.    Noting stress with no employment voe rhte past 3 years, previously in weekly counseling and a trial of sertraline - now no current tx.    No previous pap - declines pap today; irregular cycle, can be every 2-3 months          6/15/2024   General Health   How would you rate your overall physical health? (!) FAIR   Feel stress (tense, anxious, or unable to sleep) Only a little      (!) STRESS CONCERN      6/15/2024   Nutrition   Three or more servings of calcium each day? (!) I DON'T KNOW   Diet: Regular (no restrictions)   How many servings of fruit and vegetables per day? (!) 0-1   How many sweetened beverages each day? (!) 2            6/15/2024   Exercise   Days per week of moderate/strenous exercise 0 days   Average minutes spent exercising at this level 0 min      (!) EXERCISE CONCERN      6/15/2024   Social Factors   Frequency of gathering with friends or relatives Never   Worry food  won't last until get money to buy more Patient declined   Food not last or not have enough money for food? Patient declined   Do you have housing? (Housing is defined as stable permanent housing and does not include staying ouside in a car, in a tent, in an abandoned building, in an overnight shelter, or couch-surfing.) Patient declined   Are you worried about losing your housing? Patient declined   Lack of transportation? Patient declined   Unable to get utilities (heat,electricity)? Patient declined      (!) SOCIAL CONNECTIONS CONCERN      6/15/2024   Dental   Dentist two times every year? Yes            6/15/2024   TB Screening   Were you born outside of the [place]? Yes      Today's PHQ-9 Score:       2024    10:35 AM   PHQ-9 SCORE   PHQ-9 Total Score MyChart 7 (Mild depression)   PHQ-9 Total Score 7         6/15/2024   Substance Use   Alcohol more than 3/day or more than 7/wk Not Applicable   Do you use any other substances recreationally? No        Social History     Tobacco Use    Smoking status: Former     Current packs/day: 0.00     Average packs/day: 1 pack/day for 10.0 years (10.0 ttl pk-yrs)     Types: Cigarettes     Start date: 2001     Quit date: 2011     Years since quittin.0    Smokeless tobacco: Never   Vaping Use    Vaping status: Never Used   Substance Use Topics    Alcohol use: No    Drug use: No                    6/15/2024   One time HIV Screening   Previous HIV test? No          6/15/2024   STI Screening   New sexual partner(s) since last STI/HIV test? No        History of abnormal Pap smear: No - age 30- 64 PAP with HPV every 5 years recommended       ASCVD Risk   The ASCVD Risk score (Otilia JUAREZ, et al., 2019) failed to calculate for the following reasons:    The systolic blood pressure is missing    Cannot find a previous HDL lab    Cannot find a previous total cholesterol lab        6/15/2024   Contraception/Family Planning   Questions about contraception or family  "planning No           Reviewed and updated as needed this visit by Provider                    BP Readings from Last 3 Encounters:   06/20/24 113/78   02/14/20 103/68   03/11/19 95/56    Wt Readings from Last 3 Encounters:   06/20/24 31.9 kg (70 lb 4.8 oz)   02/14/20 34.5 kg (76 lb)   03/11/19 33.7 kg (74 lb 6.4 oz)                 Objective    Exam  LMP  (LMP Unknown)    Estimated body mass index is 14.36 kg/m  as calculated from the following:    Height as of 2/14/20: 1.549 m (5' 1\").    Weight as of 2/14/20: 34.5 kg (76 lb).    Physical Exam  GENERAL: alert and no distress  EYES: Eyes grossly normal to inspection, PERRL and conjunctivae and sclerae normal  HENT: ear canals and TM's normal, nose and mouth without ulcers or lesions  NECK: no adenopathy, no asymmetry, masses, or scars  RESP: lungs clear to auscultation - no rales, rhonchi or wheezes  CV: regular rate and rhythm, normal S1 S2, no S3 or S4, no murmur, click or rub, no peripheral edema  ABDOMEN: soft, nontender, no hepatosplenomegaly, no masses and bowel sounds normal  MS: no gross musculoskeletal defects noted, no edema  SKIN: no suspicious lesions or rashes  NEURO: Normal strength and tone, mentation intact and speech normal  PSYCH: mentation appears very anxious, affect matching        Signed Electronically by: Deion Srviastava PA-C    Answers submitted by the patient for this visit:  Patient Health Questionnaire (Submitted on 6/19/2024)  If you checked off any problems, how difficult have these problems made it for you to do your work, take care of things at home, or get along with other people?: Somewhat difficult  PHQ9 TOTAL SCORE: 7    "

## 2024-07-02 ENCOUNTER — ANCILLARY PROCEDURE (OUTPATIENT)
Dept: MAMMOGRAPHY | Facility: CLINIC | Age: 44
End: 2024-07-02
Attending: PHYSICIAN ASSISTANT
Payer: COMMERCIAL

## 2024-07-02 ENCOUNTER — TELEPHONE (OUTPATIENT)
Dept: FAMILY MEDICINE | Facility: CLINIC | Age: 44
End: 2024-07-02

## 2024-07-02 DIAGNOSIS — Z12.31 VISIT FOR SCREENING MAMMOGRAM: ICD-10-CM

## 2024-07-02 PROCEDURE — 77063 BREAST TOMOSYNTHESIS BI: CPT | Mod: TC | Performed by: RADIOLOGY

## 2024-07-02 PROCEDURE — 77067 SCR MAMMO BI INCL CAD: CPT | Mod: TC | Performed by: RADIOLOGY

## 2024-07-02 NOTE — TELEPHONE ENCOUNTER
FYI - Status Update    Who is Calling: patient    Update: PT was being seen by  Deion Srivastava at [facility] but new INS started 7/1 and pt can no longer be seen there and is concerned about mammo results from 7/2 at  and why it says further imaging and what she needs so would like Miguel Angel East, to review results as she has an appt with him tomorrow morning    Does caller want a call/response back: No since appt is in morning

## 2024-07-03 ENCOUNTER — OFFICE VISIT (OUTPATIENT)
Dept: FAMILY MEDICINE | Facility: CLINIC | Age: 44
End: 2024-07-03
Payer: COMMERCIAL

## 2024-07-03 VITALS
HEART RATE: 78 BPM | HEIGHT: 61 IN | DIASTOLIC BLOOD PRESSURE: 70 MMHG | SYSTOLIC BLOOD PRESSURE: 114 MMHG | OXYGEN SATURATION: 100 % | BODY MASS INDEX: 13.5 KG/M2 | TEMPERATURE: 97.5 F | WEIGHT: 71.5 LBS

## 2024-07-03 DIAGNOSIS — R92.8 ABNORMAL MAMMOGRAM: Primary | ICD-10-CM

## 2024-07-03 DIAGNOSIS — F41.1 GAD (GENERALIZED ANXIETY DISORDER): ICD-10-CM

## 2024-07-03 PROCEDURE — G2211 COMPLEX E/M VISIT ADD ON: HCPCS | Performed by: FAMILY MEDICINE

## 2024-07-03 PROCEDURE — 99213 OFFICE O/P EST LOW 20 MIN: CPT | Performed by: FAMILY MEDICINE

## 2024-07-03 NOTE — TELEPHONE ENCOUNTER
I did see the patient today, she is quite nervous and anxious about her abnormal mammogram, also she has concerns and questions about her insurance status.  I just want to make sure that additional orders have been placed for follow-up mammography, and also that there is not any significant insurance issue either thanks for this, Dr. East

## 2024-07-03 NOTE — PROGRESS NOTES
Assessment & Plan     (R92.8) Abnormal mammogram  (primary encounter diagnosis)  Comment: Patient just yesterday had an abnormal mammogram, essentially with incomplete imaging, history of dense breasts.  Plan:      (F41.1) LAURA (generalized anxiety disorder)  Comment: Patient does have a history of underlying anxiety which I do think is playing a role in some of the concerns and fears that she has  Plan:      PLAN:  1.  Patient was given information about scheduling also have nursing staff make sure that the patient is set up for additional imaging as requested by radiology.  2.  Follow-up as needed for this.                Andi Gibbons is a 44 year old, presenting for the following health issues:  Follow Up (Mammo results )      7/3/2024    10:14 AM   Additional Questions   Roomed by Jessica TONY MA     History of Present Illness       Reason for visit:  7/2/24 Mammogram Results Follow-up    She eats 0-1 servings of fruits and vegetables daily.She consumes 3 sweetened beverage(s) daily.She exercises with enough effort to increase her heart rate 9 or less minutes per day.  She exercises with enough effort to increase her heart rate 3 or less days per week.   She is taking medications regularly.     Patient comes in for follow-up of an abnormal mammogram.  The patient had a mammogram yesterday, it was her first mammogram, patient was noted to have dense breasts bilaterally as well as calcification of the left outer part of the breast, radiology recommended additional views.    Patient comes in because she is quite concerned and worried about what this might mean.  I told the patient that this is common for additional images, I told her that calcifications can be a sign of a cancerous process however this is not at all diagnostic I told the patient be simply need more images in order to determine if there is any concern or not.    There is no known history of breast cancer in her family.    Patient also reports  "that she actually has had an insurance change, she is under the understanding that her insurance pays only for her to either be seen at certain clinics or to get mammography at certain clinics.    I told the patient that typically additional images are ordered automatically but I will make sure that she does get the follow-through that she needs.    I do note that the patient has a history of anxiety, she is quite worried and concerned about this and I suspect her underlying anxiety is playing a role.                      Objective    /70 (BP Location: Left arm, Patient Position: Sitting, Cuff Size: Adult Small)   Pulse 78   Temp 97.5  F (36.4  C) (Temporal)   Ht 1.537 m (5' 0.5\")   Wt 32.4 kg (71 lb 8 oz)   LMP  (LMP Unknown)   SpO2 100%   BMI 13.73 kg/m    Body mass index is 13.73 kg/m .  Physical Exam               Signed Electronically by: Miguel Angel East MD    "

## 2024-07-03 NOTE — TELEPHONE ENCOUNTER
Per chart review, Dr Srivastava ordered   MA Diagnostic Digital Left       (Order ID: [id number])     Diagnosis:  Abnormal mammogram (R92.8)  on 07/02/2024.     Maria Alejandra Malone RN

## 2024-07-05 ENCOUNTER — HOSPITAL ENCOUNTER (OUTPATIENT)
Dept: MAMMOGRAPHY | Facility: CLINIC | Age: 44
Discharge: HOME | End: 2024-07-05
Attending: PHYSICIAN ASSISTANT | Admitting: PHYSICIAN ASSISTANT
Payer: COMMERCIAL

## 2024-07-05 DIAGNOSIS — R92.8 ABNORMAL MAMMOGRAM: ICD-10-CM

## 2024-07-05 PROCEDURE — 77065 DX MAMMO INCL CAD UNI: CPT | Mod: LT

## 2024-07-17 ENCOUNTER — HOSPITAL ENCOUNTER (OUTPATIENT)
Dept: MAMMOGRAPHY | Facility: CLINIC | Age: 44
Discharge: HOME | End: 2024-07-17
Attending: PHYSICIAN ASSISTANT
Payer: COMMERCIAL

## 2024-07-17 DIAGNOSIS — R92.8 ABNORMAL MAMMOGRAM: ICD-10-CM

## 2024-07-17 PROCEDURE — 999N000065 MA POST PROCEDURE LEFT

## 2024-07-17 PROCEDURE — 250N000009 HC RX 250: Performed by: RADIOLOGY

## 2024-07-17 PROCEDURE — 88305 TISSUE EXAM BY PATHOLOGIST: CPT | Mod: TC | Performed by: PHYSICIAN ASSISTANT

## 2024-07-17 PROCEDURE — 88305 TISSUE EXAM BY PATHOLOGIST: CPT | Mod: 26 | Performed by: PATHOLOGY

## 2024-07-17 PROCEDURE — 272N000715 MA STEREOTACTIC BREAST BIOPSY VACUUM LT

## 2024-07-17 RX ADMIN — LIDOCAINE HYDROCHLORIDE 5 ML: 10 INJECTION, SOLUTION INFILTRATION; PERINEURAL at 13:19

## 2024-07-17 RX ADMIN — LIDOCAINE HYDROCHLORIDE 10 ML: 10; .005 INJECTION, SOLUTION EPIDURAL; INFILTRATION; INTRACAUDAL; PERINEURAL at 13:19

## 2024-07-17 NOTE — DISCHARGE INSTRUCTIONS
After Your Breast Biopsy  Bleeding, bruising, and pain  Breast tenderness and some bruising is normal and may last several days. You may wear your bra overnight to support the breast.  You may use an ice pack for pain. Place it over the area for 15 to 20 minutes, several times a day.  You may take over-the-counter pain medicine:  On the day of the biopsy, we recommend Tylenol (acetaminophen) because it does not raise your risk of bleeding.  The next day, you may take an anti-inflammatory medicine (aspirin, ibuprofen, Motrin, Aleve, Advil), unless your doctor tells you not to.  Bandages and showering  Keep your bandage in place until tomorrow morning. Don't get it wet.  If you have small pieces of tape on the skin, leave them in place. They will fall off on their own, or you can remove them after 5 days.  You may shower the next morning after your biopsy.  Activity  No heavy activity (no running, no gym workouts, no lifting, no vacuuming, etc.) on the day of your biopsy.  You may go back to normal activity the next day. But limit what you do if you still have pain or discomfort.  Infection  Infection is rare. Signs of infection include:  Fever (including sweats and chills)  Redness  Pain that gets worse  Fluid draining from the biopsy site  Biopsy results  Results may take up to 5 business days.  A nurse or doctor from the Breast Center will call with your results. We will also send the results to the doctor that ordered your biopsy.  If you have not gotten your results in 5 days, please call the Breast Center.  Call the Breast Center with questions or if:   You have bleeding that lasts more than 20 minutes.  You have pain that you can't control.  You have signs of infection (fever, sweats, chills, redness, increasing pain, or drainage).  After hours, please call the doctor who ordered your biopsy.  For informational purposes only. Not to replace the advice of your health care provider. Copyright   2010 [facility]. All rights reserved. Clinically reviewed by Kandi Gregory, Director, [facility]. [employer] [id number] - REV 08/23.

## 2024-07-22 ENCOUNTER — TELEPHONE (OUTPATIENT)
Dept: MAMMOGRAPHY | Facility: CLINIC | Age: 44
End: 2024-07-22
Payer: COMMERCIAL

## 2024-07-22 LAB
PATH REPORT.COMMENTS IMP SPEC: NORMAL
PATH REPORT.COMMENTS IMP SPEC: NORMAL
PATH REPORT.FINAL DX SPEC: NORMAL
PATH REPORT.GROSS SPEC: NORMAL
PATH REPORT.MICROSCOPIC SPEC OTHER STN: NORMAL
PHOTO IMAGE: NORMAL

## 2024-07-22 NOTE — TELEPHONE ENCOUNTER
Call placed to Edwige.   verified.     Edwige was notified that pathology results from her 2024 LEFT BREAST BIOPSY revealed:     [facility]  Werner Harrison [id number]  F, 1980  Surgical Pathology Report (Final result) [id number]  Authorizing Provider: Deion Srivastava PA-C Ordering Provider: Deion Srivastava PA-C  Ordering Location: [facility]  Collected: 2024 01:23 PM  Pathologist: Ladarius Reeves MD Received: 2024 02:02 PM  .  Specimens  A Breast, Left  .  .  Final Diagnosis  Left breast, 2:00, 3 cm from nipple, stereotactic guided needle core biopsies:  -- Benign breast tissue with stromal fibrosis and epithelial and stromal calcifications.  Electronically signed by Ladarius Reeves MD on 2024 at 10:36 AM     Per [facility] Radiologist, Dr. Erica Xiao, results are concordant with imaging findings.     Recommendation: Annual Screening Mammograms     Edwige denies any concerns with the biopsy site. Ordering provider was informed of the results and follow up plan.  I encouraged her to contact her doctor with any further breast concerns.  Patient verbalized understanding and agrees with the plan of care.        Shira Bejarano RN BSN  Procedure Nurse  [facility]  [phone]

## 2024-08-30 ENCOUNTER — DOCUMENTATION ONLY (OUTPATIENT)
Dept: OTHER | Facility: CLINIC | Age: 44
End: 2024-08-30
Payer: COMMERCIAL

## 2025-06-19 ENCOUNTER — PATIENT OUTREACH (OUTPATIENT)
Dept: CARE COORDINATION | Facility: CLINIC | Age: 45
End: 2025-06-19
Payer: COMMERCIAL

## 2025-08-03 ENCOUNTER — HEALTH MAINTENANCE LETTER (OUTPATIENT)
Age: 45
End: 2025-08-03